# Patient Record
Sex: FEMALE | Race: WHITE | NOT HISPANIC OR LATINO | Employment: FULL TIME | URBAN - NONMETROPOLITAN AREA
[De-identification: names, ages, dates, MRNs, and addresses within clinical notes are randomized per-mention and may not be internally consistent; named-entity substitution may affect disease eponyms.]

---

## 2020-04-12 ENCOUNTER — NURSE TRIAGE (OUTPATIENT)
Dept: CALL CENTER | Facility: HOSPITAL | Age: 30
End: 2020-04-12

## 2020-04-12 NOTE — TELEPHONE ENCOUNTER
Feeling SOB but feels it is due to anxiety. Caller has ETOH abuse, she is attempting to stop drinking. Was started on Naltrexone about 2 weeks ago but started drinking on Thursday and has been binge drinking since Thursday. She usually drinks Vodka but switched to hard seltzer yesterday. She has been seeing a therapist but did not call her when she started drinking. She stopped taking the Naltrexone when she started drinking. Heart is beating so fast and she feels panicky. Has been pacing. Recently moved here from [facility]. She is not taking any antianxiety medications. She tried [facility] she googled the number but they were closed. The main thing she is worried about is her heart feels like it is too fast. She checked her pulse and it was 84 bpm, it feels like it is pounding. She says she knows she is panicky. She has an appt with her therapist on Tuesday. She is willing to speak with [facility]. I called the [facility] line and compoleted a warm transfer between the caller and [facility] at [facility].    Reason for Disposition  • Requesting to talk with a counselor (mental health worker, psychiatrist, etc.)    Additional Information  • Negative: Coma (e.g., not moving, not talking, not responding to stimuli)  • Negative: Difficult to awaken or acting confused (e.g., disoriented, slurred speech)  • Negative: Seeing, hearing, or feeling things that are not there (i.e., visual, auditory, or tactile hallucinations)  • Negative: Slow, shallow and weak breathing  • Negative: Seizure  • Negative: Violent behavior, or threatening to physically hurt or kill someone  • Negative: Patient attempted suicide  • Negative: Threatening suicide  • Negative: Sounds like a life-threatening emergency to the triager  • Negative: Recent significant injury, see that guideline (e.g., head, neck, chest, abdominal or extremity  injury)  • Negative: Substance abuse or dependence: question or problem related to  •  "Negative: Depression is main problem or symptom (e.g., feelings of sadness or hopelessness)  • Negative: SEVERE abdominal pain  • Negative: [1] Constant abdominal pain AND [2] present > 2 hours  • Negative: Blood in bowel movements (e.g., black, tarry or red blood)  (Exception: Blood on surface of BM with constipation)  • Negative: [1] Vomiting AND [2] contains red blood or black (\"coffee ground\") material  (Exception: few red streaks in vomit that only happened once)  • Negative: [1] Vomiting or dry heaves AND [2] occurring frequently (i.e., vomiting > 4 times in last 4 hours)  • Negative: Feeling very shaky (i.e., visible tremors of hands)  • Negative: Patient sounds very sick or weak to the triager  • Negative: White of the eyes have turned yellow (i.e., jaundice)  • Negative: Fever > 101.5 F (38.6 C)  • Negative: [1] Drinks alcohol daily AND [2] prior alcohol withdrawal seizures  • Negative: [1] Drinks alcohol daily AND [2] prior DTs (delirium tremens)  • Negative: Requesting admission for alcohol abuse    Answer Assessment - Initial Assessment Questions  1. DO YOU DRINK: \"Do you drink alcohol, including beer, wine or hard liquor?\"      *No Answer*  2. HOW OFTEN: \"How many days per week do you typically drink alcohol?\"      *No Answer*  3. HOW MUCH: \"How many drinks do you typically have on days when you drink?\" (1.5 oz hard liquor [one shot or jigger; 45 ml], 5 oz wine [small glass; 150 ml], 12 oz beer [one can; 360 ml])      *No Answer*  4. MOST: \"What is the most that you have had to drink on any one occasion in the last month?\"      *No Answer*  5. LAST 24 HOURS: \"Have you had a drink within the last 24 hours?\"      *No Answer*  6. DRINKING PROBLEM: \"Do you have or have you ever had a drinking problem?\"      *No Answer*  7. DRUG PROBLEM: \"Are you using any other drugs?\" (e.g., yes/no; cocaine, prescription medications, etc.)      Smoked pot in the last 30 days  8. SYMPTOMS: \"What symptoms are you currently " "experiencing?\" (e.g., none, tremors or shakiness, abdominal pain, vomiting, blackout spells)   panicky, feels shaky, no abdominal pain. Vomited yesterday, no vomiting today. No black out spells.  9. DETOX PROGRAM: \"Have you ever gone through a detox program?\"      *No Answer*  10. THERAPIST: \"Do you have a counselor or therapist? Name?\"        Has a therapist, Sherita Carlos  Just started going to Dr Ellis has seen Loyda Adhikari  11. SUPPORT: \"Who is with you now?\" \"Who do you live with?\" \"Do you have family or friends nearby who you can talk to?\" \"Are you a member of Alcoholics Anonymous?\"     no  12. PREGNANCY: \"Is there any chance you are pregnant?\" \"When was your last menstrual period?\"     no    Protocols used: ALCOHOL ABUSE AND DEPENDENCE-ADULT-AH      "

## 2020-06-04 ENCOUNTER — TELEMEDICINE (OUTPATIENT)
Dept: FAMILY MEDICINE CLINIC | Age: 30
End: 2020-06-04
Payer: COMMERCIAL

## 2020-06-04 VITALS — WEIGHT: 160 LBS | TEMPERATURE: 97.6 F | HEIGHT: 68 IN | BODY MASS INDEX: 24.25 KG/M2 | HEART RATE: 86 BPM

## 2020-06-04 PROBLEM — F32.A ANXIETY AND DEPRESSION: Status: ACTIVE | Noted: 2020-06-04

## 2020-06-04 PROBLEM — F10.21 ALCOHOL DEPENDENCE IN REMISSION (HCC): Status: ACTIVE | Noted: 2020-06-04

## 2020-06-04 PROBLEM — F41.9 ANXIETY AND DEPRESSION: Status: ACTIVE | Noted: 2020-06-04

## 2020-06-04 PROBLEM — F11.21 OPIOID DEPENDENCE IN REMISSION (HCC): Status: ACTIVE | Noted: 2020-06-04

## 2020-06-04 PROCEDURE — 99204 OFFICE O/P NEW MOD 45 MIN: CPT | Performed by: FAMILY MEDICINE

## 2020-06-04 RX ORDER — BUSPIRONE HYDROCHLORIDE 10 MG/1
TABLET ORAL
COMMUNITY
Start: 2020-05-19 | End: 2020-07-16

## 2020-06-04 RX ORDER — CITALOPRAM 10 MG/1
TABLET ORAL
COMMUNITY
Start: 2020-05-19 | End: 2020-06-04

## 2020-06-04 RX ORDER — ONDANSETRON HYDROCHLORIDE 8 MG/1
TABLET, FILM COATED ORAL
COMMUNITY
Start: 2020-04-13 | End: 2020-07-28 | Stop reason: SDUPTHER

## 2020-06-04 RX ORDER — SERTRALINE HYDROCHLORIDE 25 MG/1
TABLET, FILM COATED ORAL
COMMUNITY
Start: 2020-06-01 | End: 2020-06-04 | Stop reason: SINTOL

## 2020-06-04 RX ORDER — NALTREXONE HYDROCHLORIDE 50 MG/1
TABLET, FILM COATED ORAL
COMMUNITY
Start: 2020-05-17

## 2020-06-04 ASSESSMENT — PATIENT HEALTH QUESTIONNAIRE - PHQ9
2. FEELING DOWN, DEPRESSED OR HOPELESS: 1
1. LITTLE INTEREST OR PLEASURE IN DOING THINGS: 1
SUM OF ALL RESPONSES TO PHQ QUESTIONS 1-9: 2
SUM OF ALL RESPONSES TO PHQ QUESTIONS 1-9: 2
SUM OF ALL RESPONSES TO PHQ9 QUESTIONS 1 & 2: 2

## 2020-06-04 NOTE — PROGRESS NOTES
2020    TELEHEALTH EVALUATION -- Audio/Visual (During VTJFH-22 public health emergency)    HPI:    Froylan Martinez (:  1990) has requested an audio/video evaluation for the following concern(s):    Patient presents to establish care. She moved to the area approximately 1 year ago. She does see Dr. Dax Patel for management of anxiety and depression, alcohol dependence, is considering a new psychiatrist but is continuing to see her for now. She is otherwise pretty healthy. Her main concern today is intermittent chest pain, left-sided. This started approximately 1 week ago, seems to be positional.  In particular, when she sits up she has pain but when she is supine she does not. She denies any association with stress or exercise. She characterizes the pain as \"electric or buzzing. \"  Occasionally moves armpit, but no further. It lasts 30 seconds to 1 minute. She denies associated nausea, vomiting, diaphoresis, dyspnea. She denies any family history of premature coronary artery disease. However she does have extensive history of A. fib, both parents. She herself notes that \"I always have A. fib type symptoms\", but they seem to be fairly transient. Spoke to 1 of her family members who is also physician, and he suggested that she might have pericarditis. She was sick about 2 weeks ago, had a nonproductive cough at that time. Denies fever or pleurisy, I asked her to cough. PHQ Scores 2020   PHQ2 Score 2   PHQ9 Score 2     Interpretation of Total Score Depression Severity: 1-4 = Minimal depression, 5-9 = Mild depression, 10-14 = Moderate depression, 15-19 = Moderately severe depression, 20-27 = Severe depression    Review of Systems   Constitutional: Negative for chills and fever. HENT: Negative for facial swelling and mouth sores. Eyes: Negative for discharge and itching. Respiratory: Negative for apnea and stridor. Cardiovascular: Positive for chest pain.  Negative for Musculoskeletal:   [x] Normal gait with no signs of ataxia         [x] Normal range of motion of neck        [] Abnormal-       Neurological:        [x] No Facial Asymmetry (Cranial nerve 7 motor function) (limited exam to video visit)          [x] No gaze palsy        [] Abnormal-         Skin:        [x] No significant exanthematous lesions or discoloration noted on facial skin         [] Abnormal-            Psychiatric:       [x] Normal Affect [] No Hallucinations        [] Abnormal-     Other pertinent observable physical exam findings-     ASSESSMENT/PLAN:  Patient Active Problem List    Diagnosis Date Noted    [facility]2020     Overview Note:     Stable, continue Naltrexone      Anxiety and depression 06/04/2020     Overview Note:     Relatively stable, follow-up with psychiatry. She states she had side effects with the Zoloft and Celexa, and is therefore only taking BuSpar even though Dr. Eduin Jacobs has advised that she needs to be on an SSRI for optimal control of symptoms. 1. Anxiety and depression    2. Medication monitoring encounter  - CBC Auto Differential; Future  - Comprehensive Metabolic Panel; Future  - Magnesium; Future  - Lipid Panel; Future    3. Screening for HIV (human immunodeficiency virus)    4. Alcohol dependence in remission ([facility]    5. Intermittent left-sided chest pain  - XR CHEST STANDARD (2 VW); Future  - TSH WITH REFLEX TO FT4; Future  -As above. Advised patient that is difficult to make a diagnosis of pericarditis without a physical exam, though her history symptoms are somewhat suggestive. Proceed with work-up as above, patient voiced understanding    6.  Family history of thyroid disease  - TSH WITH REFLEX TO FT4; Future    Health Maintenance   Topic Date Due    DTaP/Tdap/Td vaccine (1 - Tdap) 11/15/2010    Cervical cancer screen  11/15/2012    Flu vaccine (Season Ended) 09/01/2020    HIV screen  Completed    Hepatitis A vaccine

## 2020-06-05 ASSESSMENT — ENCOUNTER SYMPTOMS
FACIAL SWELLING: 0
APNEA: 0
NAUSEA: 0
VOMITING: 0
STRIDOR: 0
EYE DISCHARGE: 0
COLOR CHANGE: 0
EYE ITCHING: 0
BACK PAIN: 0

## 2020-07-16 ENCOUNTER — TELEMEDICINE (OUTPATIENT)
Dept: FAMILY MEDICINE CLINIC | Age: 30
End: 2020-07-16
Payer: COMMERCIAL

## 2020-07-16 PROCEDURE — G8431 POS CLIN DEPRES SCRN F/U DOC: HCPCS | Performed by: FAMILY MEDICINE

## 2020-07-16 PROCEDURE — 99214 OFFICE O/P EST MOD 30 MIN: CPT | Performed by: FAMILY MEDICINE

## 2020-07-16 PROCEDURE — G0444 DEPRESSION SCREEN ANNUAL: HCPCS | Performed by: FAMILY MEDICINE

## 2020-07-16 RX ORDER — DULOXETIN HYDROCHLORIDE 30 MG/1
30 CAPSULE, DELAYED RELEASE ORAL DAILY
Qty: 30 CAPSULE | Refills: 1 | Status: SHIPPED | OUTPATIENT
Start: 2020-07-16

## 2020-07-16 RX ORDER — HYDROXYZINE HYDROCHLORIDE 25 MG/1
25 TABLET, FILM COATED ORAL EVERY 8 HOURS PRN
Qty: 90 TABLET | Refills: 0 | Status: SHIPPED | OUTPATIENT
Start: 2020-07-16

## 2020-07-16 ASSESSMENT — PATIENT HEALTH QUESTIONNAIRE - PHQ9
5. POOR APPETITE OR OVEREATING: 3
3. TROUBLE FALLING OR STAYING ASLEEP: 3
1. LITTLE INTEREST OR PLEASURE IN DOING THINGS: 2
7. TROUBLE CONCENTRATING ON THINGS, SUCH AS READING THE NEWSPAPER OR WATCHING TELEVISION: 0
SUM OF ALL RESPONSES TO PHQ QUESTIONS 1-9: 14
10. IF YOU CHECKED OFF ANY PROBLEMS, HOW DIFFICULT HAVE THESE PROBLEMS MADE IT FOR YOU TO DO YOUR WORK, TAKE CARE OF THINGS AT HOME, OR GET ALONG WITH OTHER PEOPLE: 1
8. MOVING OR SPEAKING SO SLOWLY THAT OTHER PEOPLE COULD HAVE NOTICED. OR THE OPPOSITE, BEING SO FIGETY OR RESTLESS THAT YOU HAVE BEEN MOVING AROUND A LOT MORE THAN USUAL: 1
SUM OF ALL RESPONSES TO PHQ9 QUESTIONS 1 & 2: 3
6. FEELING BAD ABOUT YOURSELF - OR THAT YOU ARE A FAILURE OR HAVE LET YOURSELF OR YOUR FAMILY DOWN: 2
SUM OF ALL RESPONSES TO PHQ QUESTIONS 1-9: 14
4. FEELING TIRED OR HAVING LITTLE ENERGY: 2
9. THOUGHTS THAT YOU WOULD BE BETTER OFF DEAD, OR OF HURTING YOURSELF: 0
2. FEELING DOWN, DEPRESSED OR HOPELESS: 1

## 2020-07-16 ASSESSMENT — ANXIETY QUESTIONNAIRES
3. WORRYING TOO MUCH ABOUT DIFFERENT THINGS: 3-NEARLY EVERY DAY
GAD7 TOTAL SCORE: 18
6. BECOMING EASILY ANNOYED OR IRRITABLE: 2-OVER HALF THE DAYS
2. NOT BEING ABLE TO STOP OR CONTROL WORRYING: 3-NEARLY EVERY DAY
4. TROUBLE RELAXING: 3-NEARLY EVERY DAY
7. FEELING AFRAID AS IF SOMETHING AWFUL MIGHT HAPPEN: 2-OVER HALF THE DAYS
5. BEING SO RESTLESS THAT IT IS HARD TO SIT STILL: 2-OVER HALF THE DAYS
1. FEELING NERVOUS, ANXIOUS, OR ON EDGE: 3-NEARLY EVERY DAY

## 2020-07-16 ASSESSMENT — ENCOUNTER SYMPTOMS
VOMITING: 0
COLOR CHANGE: 0
FACIAL SWELLING: 0
STRIDOR: 0
EYE DISCHARGE: 0
BACK PAIN: 0
EYE ITCHING: 0
NAUSEA: 0
APNEA: 0

## 2020-07-16 NOTE — PROGRESS NOTES
2020    TELEHEALTH EVALUATION -- Audio/Visual (During TTPFM-67 public health emergency)    HPI:    Jane Cobos (:  1990) has requested an audio/video evaluation for the following concern(s):    Patient presents for follow-up of anxiety and depression. She states she no longer is seen Dr. Gualberto Aguiar, she is looking for a new psychiatrist covered by her insurance. In the interim, she does feel like her medication need to be adjusted. She weaned herself off of BuSpar and Zoloft, stopped taking BuSpar altogether about 2 weeks ago and Zoloft approximately 3 months ago. Her anxiety and depression scores are as noted below. YONIS 7 SCORE 2020   YONIS-7 Total Score 18     Interpretation of YONIS-7 score: 5-9 = mild anxiety, 10-14 = moderate anxiety, 15+ = severe anxiety. Recommend referral to behavioral health for scores 10 or greater. PHQ Scores 2020   PHQ2 Score 3 2   PHQ9 Score 14 2     Interpretation of Total Score Depression Severity: 1-4 = Minimal depression, 5-9 = Mild depression, 10-14 = Moderate depression, 15-19 = Moderately severe depression, 20-27 = Severe depression    Review of Systems   Constitutional: Negative for chills and fever. HENT: Negative for facial swelling and mouth sores. Eyes: Negative for discharge and itching. Respiratory: Negative for apnea and stridor. Cardiovascular: Negative for chest pain and palpitations. Gastrointestinal: Negative for nausea and vomiting. Endocrine: Negative for cold intolerance and heat intolerance. Genitourinary: Negative for frequency and urgency. Musculoskeletal: Negative for arthralgias and back pain. Skin: Negative for color change and rash. Psychiatric/Behavioral: Positive for dysphoric mood. The patient is nervous/anxious. Prior to Visit Medications    Medication Sig Taking?  Authorizing Provider   DULoxetine (CYMBALTA) 30 MG extended release capsule Take 1 capsule by mouth daily Yes Alejandra Gunter Christian Shin MD   hydrOXYzine (ATARAX) 25 MG tablet Take 1 tablet by mouth every 8 hours as needed for Anxiety Yes Willard Soto MD   naltrexone (DEPADE) 50 MG tablet TK 1 T PO QD  Historical Provider, MD   ondansetron (ZOFRAN) 8 MG tablet TK 1 T PO TID  Historical Provider, MD       Social History     Tobacco Use    Smoking status: Never Smoker    Smokeless tobacco: Never Used   Substance Use Topics    Alcohol use: Not Currently     Comment: now on naltrexone, rarely drinks now    Drug use: Yes     Types: Marijuana            PHYSICAL EXAMINATION:  [ INSTRUCTIONS:  \"[x]\" Indicates a positive item  \"[]\" Indicates a negative item  -- DELETE ALL ITEMS NOT EXAMINED]  Vital Signs: (As obtained by patient/caregiver or practitioner observation)    Blood pressure-  Heart rate-    Respiratory rate-    Temperature-  Pulse oximetry-     Patient does not have the equipment to obtain 3 vital signs. Constitutional: [x] Appears well-developed and well-nourished [x] No apparent distress      [] Abnormal-   Mental status  [x] Alert and awake  [x] Oriented to person/place/time [x]Able to follow commands      Eyes:  EOM    [x]  Normal  [] Abnormal-  Sclera  [x]  Normal  [] Abnormal -         Discharge [x]  None visible  [] Abnormal -    HENT:   [x] Normocephalic, atraumatic.   [] Abnormal   [x] Mouth/Throat: Mucous membranes are moist.     External Ears [x] Normal  [] Abnormal-     Neck: [x] No visualized mass     Pulmonary/Chest: [x] Respiratory effort normal.  [x] No visualized signs of difficulty breathing or respiratory distress        [] Abnormal-      Musculoskeletal:   [x] Normal gait with no signs of ataxia         [x] Normal range of motion of neck        [] Abnormal-       Neurological:        [x] No Facial Asymmetry (Cranial nerve 7 motor function) (limited exam to video visit)          [x] No gaze palsy        [] Abnormal-         Skin:        [x] No significant exanthematous lesions or discoloration noted on facial skin         [] Abnormal-            Psychiatric:       [x] Normal Affect [x] No Hallucinations        [] Abnormal-     Other pertinent observable physical exam findings-     ASSESSMENT/PLAN:  Patient Active Problem List    Diagnosis Date Noted    [facility] 06/04/2020     Overview Note:     Stable, continue Naltrexone      Anxiety and depression 06/04/2020     Overview Note:     Relatively stable, follow-up with psychiatry. She states she had side effects with the Zoloft and Celexa, and is therefore only taking BuSpar even though Dr. Sabina Bustillo has advised that she needs to be on an SSRI for optimal control of symptoms. Maria Isabel Wang was seen today for follow-up. Diagnoses and all orders for this visit:    Anxiety and depression  -     DULoxetine (CYMBALTA) 30 MG extended release capsule; Take 1 capsule by mouth daily  -     hydrOXYzine (ATARAX) 25 MG tablet; Take 1 tablet by mouth every 8 hours as needed for Anxiety    Positive depression screening  -     Positive Screen for Clinical Depression with a Documented Follow-up Plan       1. Location of patient - Home  2. Location of physician - Office  3. Other individuals on this call - no  4. Time documentation - Over 50% of the total visit time of 25 minutes was spent on counseling and/or coordination of care of   1. Anxiety and depression    2. Positive depression screening          Return for already scheduled follow-up. Kathy Jimenez is a 30 y.o. female being evaluated by a Virtual Visit (video visit) encounter to address concerns as mentioned above. A caregiver was present when appropriate. Due to this being a TeleHealth encounter (During AIJKN-95 public health emergency), evaluation of the following organ systems was limited: Vitals/Constitutional/EENT/Resp/CV/GI//MS/Neuro/Skin/Heme-Lymph-Imm.   Pursuant to the emergency declaration under the [facility] waiver authority and the

## 2020-07-21 ENCOUNTER — TELEPHONE (OUTPATIENT)
Dept: FAMILY MEDICINE CLINIC | Age: 30
End: 2020-07-21

## 2020-07-21 NOTE — TELEPHONE ENCOUNTER
Pt called and wanted to let you know the duloxetine she started was making her feel awful and she is not going to be able to take it. She says she had mentioned the other medications that she tried and they always make her sick. She also says the hydroxyzine has helped but makes her very tired. Please advise?

## 2020-07-27 PROBLEM — F10.239 ALCOHOL DEPENDENCE WITH WITHDRAWAL (HCC): Status: ACTIVE | Noted: 2020-06-04

## 2020-08-31 ENCOUNTER — OFFICE VISIT (OUTPATIENT)
Age: 30
End: 2020-08-31

## 2020-08-31 VITALS — TEMPERATURE: 96.8 F | HEART RATE: 100 BPM | OXYGEN SATURATION: 95 %

## 2020-08-31 NOTE — PATIENT INSTRUCTIONS
Preventing the Spread of Coronavirus Disease 2019 in Homes and Residential Communities   For the most recent information go to [email or web address]    Prevention steps for People with confirmed or suspected COVID-19 (including persons under investigation) who do not need to be hospitalized  and   People with confirmed COVID-19 who were hospitalized and determined to be medically stable to go home    Your healthcare provider and public health staff will evaluate whether you can be cared for at home. If it is determined that you do not need to be hospitalized and can be isolated at home, you will be monitored by staff from your local or state health department. You should follow the prevention steps below until a healthcare provider or local or state health department says you can return to your normal activities. Stay home except to get medical care  People who are mildly ill with COVID-19 are able to isolate at home during their illness. You should restrict activities outside your home, except for getting medical care. Do not go to work, school, or public areas. Avoid using public transportation, ride-sharing, or taxis. Separate yourself from other people and animals in your home  People: As much as possible, you should stay in a specific room and away from other people in your home. Also, you should use a separate bathroom, if available. Animals: You should restrict contact with pets and other animals while you are sick with COVID-19, just like you would around other people. Although there have not been reports of pets or other animals becoming sick with COVID-19, it is still recommended that people sick with COVID-19 limit contact with animals until more information is known about the virus. When possible, have another member of your household care for your animals while you are sick.  If you are sick with COVID-19, avoid contact with your pet, including petting, snuggling, being kissed or licked, and sharing food. If you must care for your pet or be around animals while you are sick, wash your hands before and after you interact with pets and wear a facemask. Call ahead before visiting your doctor  If you have a medical appointment, call the healthcare provider and tell them that you have or may have COVID-19. This will help the healthcare providers office take steps to keep other people from getting infected or exposed. Wear a facemask  You should wear a facemask when you are around other people (e.g., sharing a room or vehicle) or pets and before you enter a healthcare providers office. If you are not able to wear a facemask (for example, because it causes trouble breathing), then people who live with you should not stay in the same room with you, or they should wear a facemask if they enter your room. Cover your coughs and sneezes  Cover your mouth and nose with a tissue when you cough or sneeze. Throw used tissues in a lined trash can. Immediately wash your hands with soap and water for at least 20 seconds or, if soap and water are not available, clean your hands with an alcohol-based hand  that contains at least 60% alcohol. Clean your hands often  Wash your hands often with soap and water for at least 20 seconds, especially after blowing your nose, coughing, or sneezing; going to the bathroom; and before eating or preparing food. If soap and water are not readily available, use an alcohol-based hand  with at least 60% alcohol, covering all surfaces of your hands and rubbing them together until they feel dry. Soap and water are the best option if hands are visibly dirty. Avoid touching your eyes, nose, and mouth with unwashed hands. Avoid sharing personal household items  You should not share dishes, drinking glasses, cups, eating utensils, towels, or bedding with other people or pets in your home.  After using these items, they should departments. [employer] allows you to send messages to your doctor, view your test results, renew your prescriptions, schedule appointments, view visit notes, and more. How Do I Sign Up? 1. In your Internet browser, go to [email or web address]  2. Click on the Sign Up Now link in the Sign In box. You will see the New Member Sign Up page. 3. Enter your [employer] Access Code exactly as it appears below. You will not need to use this code after youve completed the sign-up process. If you do not sign up before the expiration date, you must request a new code. [employer] Access Code: Activation code not generated  Current [facility] Status: Active    4. Enter your Social Security Number ([id number]) and Date of Birth (mm/dd/yyyy) as indicated and click Submit. You will be taken to the next sign-up page. 5. Create a [facility] ID. This will be your [employer] login ID and cannot be changed, so think of one that is secure and easy to remember. 6. Create a [employer] password. You can change your password at any time. 7. Enter your Password Reset Question and Answer. This can be used at a later time if you forget your password. 8. Enter your e-mail address. You will receive e-mail notification when new information is available in [address] 9. Click Sign Up. You can now view your medical record. Additional Information  If you have questions, please contact the physician practice where you receive care. Remember, [facility] is NOT to be used for urgent needs. For medical emergencies, dial 911. For questions regarding your [employer] account call [phone]. If you have a clinical question, please call your doctor's office.

## 2020-08-31 NOTE — PROGRESS NOTES
2020    Cinda Phillips (:  1990) is a 30 y.o. female, here requesting COVID-19 testing    History of Present Illness  Pt is here for COVID swab, not evaluated in this clinic    Vitals:    20 1728   Pulse: 100   Temp: 96.8 °F (36 °C)   SpO2: 95%       ASSESSMENT  Screening for COVID-19/ Viral disease    PLAN    COVID-19 sample collected and submitted  Patient given detailed CDC instructions contained within After Visit Summary       An  electronic signature was used to authenticate this note.     --GERA Wolfe - CNP on 2020 at 5:30 PM

## 2020-09-02 LAB — SARS-COV-2, NAA: NOT DETECTED

## 2020-09-10 ENCOUNTER — TELEPHONE (OUTPATIENT)
Dept: ADMINISTRATIVE | Age: 30
End: 2020-09-10

## 2021-05-05 ENCOUNTER — TELEPHONE (OUTPATIENT)
Dept: FAMILY MEDICINE CLINIC | Age: 31
End: 2021-05-05

## 2021-05-05 NOTE — TELEPHONE ENCOUNTER
Patient called and left a voicemail that she had some questions about a possible stomach bug. I tried to return the call and got her voicemail, asked that she call back and we can set her up with a NP for a virtual visit or in office.

## 2021-08-31 ENCOUNTER — APPOINTMENT (OUTPATIENT)
Dept: CT IMAGING | Facility: HOSPITAL | Age: 31
End: 2021-08-31

## 2021-08-31 ENCOUNTER — HOSPITAL ENCOUNTER (INPATIENT)
Facility: HOSPITAL | Age: 31
LOS: 4 days | Discharge: HOME | End: 2021-09-04
Attending: INTERNAL MEDICINE | Admitting: FAMILY MEDICINE

## 2021-08-31 DIAGNOSIS — U07.1 COVID-19: ICD-10-CM

## 2021-08-31 DIAGNOSIS — Z86.19 HISTORY OF SALMONELLA INFECTION: ICD-10-CM

## 2021-08-31 DIAGNOSIS — R79.89 ELEVATED LACTIC ACID LEVEL: ICD-10-CM

## 2021-08-31 DIAGNOSIS — R31.9 URINARY TRACT INFECTION WITH HEMATURIA, SITE UNSPECIFIED: ICD-10-CM

## 2021-08-31 DIAGNOSIS — K85.20 ALCOHOL-INDUCED ACUTE PANCREATITIS WITHOUT INFECTION OR NECROSIS: Primary | ICD-10-CM

## 2021-08-31 DIAGNOSIS — N39.0 URINARY TRACT INFECTION WITH HEMATURIA, SITE UNSPECIFIED: ICD-10-CM

## 2021-08-31 DIAGNOSIS — R74.01 TRANSAMINITIS: ICD-10-CM

## 2021-08-31 LAB
ALBUMIN SERPL-MCNC: 4.4 G/DL (ref 3.5–5.2)
ALBUMIN/GLOB SERPL: 1.4 G/DL
ALP SERPL-CCNC: 130 U/L (ref 39–117)
ALT SERPL W P-5'-P-CCNC: 114 U/L (ref 1–33)
AMPHET+METHAMPHET UR QL: NEGATIVE
AMPHETAMINES UR QL: NEGATIVE
ANION GAP SERPL CALCULATED.3IONS-SCNC: 33 MMOL/L (ref 5–15)
APTT PPP: 24.9 SECONDS (ref 24.1–35)
AST SERPL-CCNC: 253 U/L (ref 1–32)
B-HCG UR QL: NEGATIVE
BACTERIA UR QL AUTO: ABNORMAL /HPF
BARBITURATES UR QL SCN: NEGATIVE
BASOPHILS # BLD AUTO: 0.05 10*3/MM3 (ref 0–0.2)
BASOPHILS NFR BLD AUTO: 0.3 % (ref 0–1.5)
BENZODIAZ UR QL SCN: POSITIVE
BILIRUB SERPL-MCNC: 1.7 MG/DL (ref 0–1.2)
BILIRUB UR QL STRIP: ABNORMAL
BUN SERPL-MCNC: 8 MG/DL (ref 6–20)
BUN/CREAT SERPL: 16 (ref 7–25)
BUPRENORPHINE SERPL-MCNC: NEGATIVE NG/ML
CALCIUM SPEC-SCNC: 9.4 MG/DL (ref 8.6–10.5)
CANNABINOIDS SERPL QL: POSITIVE
CHLORIDE SERPL-SCNC: 90 MMOL/L (ref 98–107)
CLARITY UR: ABNORMAL
CO2 SERPL-SCNC: 16 MMOL/L (ref 22–29)
COCAINE UR QL: NEGATIVE
COLOR UR: ABNORMAL
CREAT SERPL-MCNC: 0.5 MG/DL (ref 0.57–1)
D-LACTATE SERPL-SCNC: 5.5 MMOL/L (ref 0.5–2)
D-LACTATE SERPL-SCNC: 7.7 MMOL/L (ref 0.5–2)
DEPRECATED RDW RBC AUTO: 53.3 FL (ref 37–54)
EOSINOPHIL # BLD AUTO: 0 10*3/MM3 (ref 0–0.4)
EOSINOPHIL NFR BLD AUTO: 0 % (ref 0.3–6.2)
ERYTHROCYTE [DISTWIDTH] IN BLOOD BY AUTOMATED COUNT: 14.6 % (ref 12.3–15.4)
ETHANOL UR QL: 0.1 %
FERRITIN SERPL-MCNC: 1048 NG/ML (ref 13–150)
GFR SERPL CREATININE-BSD FRML MDRD: 146 ML/MIN/1.73
GFR SERPL CREATININE-BSD FRML MDRD: >150 ML/MIN/1.73
GLOBULIN UR ELPH-MCNC: 3.1 GM/DL
GLUCOSE SERPL-MCNC: 107 MG/DL (ref 65–99)
GLUCOSE UR STRIP-MCNC: NEGATIVE MG/DL
HAV IGM SERPL QL IA: NORMAL
HBV CORE IGM SERPL QL IA: NORMAL
HBV SURFACE AG SERPL QL IA: NORMAL
HCT VFR BLD AUTO: 53 % (ref 34–46.6)
HCV AB SER DONR QL: NORMAL
HGB BLD-MCNC: 18.9 G/DL (ref 12–15.9)
HGB UR QL STRIP.AUTO: ABNORMAL
HOLD SPECIMEN: NORMAL
HOLD SPECIMEN: NORMAL
HYALINE CASTS UR QL AUTO: ABNORMAL /LPF
IMM GRANULOCYTES # BLD AUTO: 0.06 10*3/MM3 (ref 0–0.05)
IMM GRANULOCYTES NFR BLD AUTO: 0.4 % (ref 0–0.5)
INR PPP: 1.16 (ref 0.91–1.09)
INTERNAL NEGATIVE CONTROL: NEGATIVE
INTERNAL POSITIVE CONTROL: POSITIVE
KETONES UR QL STRIP: ABNORMAL
LDH SERPL-CCNC: 376 U/L (ref 135–214)
LEUKOCYTE ESTERASE UR QL STRIP.AUTO: ABNORMAL
LIPASE SERPL-CCNC: 1290 U/L (ref 13–60)
LYMPHOCYTES # BLD AUTO: 1.13 10*3/MM3 (ref 0.7–3.1)
LYMPHOCYTES NFR BLD AUTO: 7 % (ref 19.6–45.3)
Lab: NORMAL
MCH RBC QN AUTO: 36.7 PG (ref 26.6–33)
MCHC RBC AUTO-ENTMCNC: 35.7 G/DL (ref 31.5–35.7)
MCV RBC AUTO: 102.9 FL (ref 79–97)
METHADONE UR QL SCN: NEGATIVE
MONOCYTES # BLD AUTO: 1.09 10*3/MM3 (ref 0.1–0.9)
MONOCYTES NFR BLD AUTO: 6.8 % (ref 5–12)
NEUTROPHILS NFR BLD AUTO: 13.81 10*3/MM3 (ref 1.7–7)
NEUTROPHILS NFR BLD AUTO: 85.5 % (ref 42.7–76)
NITRITE UR QL STRIP: POSITIVE
NRBC BLD AUTO-RTO: 0 /100 WBC (ref 0–0.2)
OPIATES UR QL: POSITIVE
OXYCODONE UR QL SCN: NEGATIVE
PCP UR QL SCN: NEGATIVE
PH UR STRIP.AUTO: <=5 [PH] (ref 5–8)
PLATELET # BLD AUTO: 224 10*3/MM3 (ref 140–450)
PMV BLD AUTO: 11.3 FL (ref 6–12)
POTASSIUM SERPL-SCNC: 3.4 MMOL/L (ref 3.5–5.2)
PROCALCITONIN SERPL-MCNC: 0.03 NG/ML (ref 0–0.25)
PROPOXYPH UR QL: NEGATIVE
PROT SERPL-MCNC: 7.5 G/DL (ref 6–8.5)
PROT UR QL STRIP: ABNORMAL
PROTHROMBIN TIME: 13.9 SECONDS (ref 11.5–13.4)
RBC # BLD AUTO: 5.15 10*6/MM3 (ref 3.77–5.28)
RBC # UR: ABNORMAL /HPF
REF LAB TEST METHOD: ABNORMAL
SARS-COV-2 RNA PNL SPEC NAA+PROBE: DETECTED
SODIUM SERPL-SCNC: 139 MMOL/L (ref 136–145)
SP GR UR STRIP: >1.03 (ref 1–1.03)
SQUAMOUS #/AREA URNS HPF: ABNORMAL /HPF
TRICYCLICS UR QL SCN: NEGATIVE
UROBILINOGEN UR QL STRIP: ABNORMAL
WBC # BLD AUTO: 16.14 10*3/MM3 (ref 3.4–10.8)
WBC UR QL AUTO: ABNORMAL /HPF
WHOLE BLOOD HOLD SPECIMEN: NORMAL
WHOLE BLOOD HOLD SPECIMEN: NORMAL

## 2021-08-31 PROCEDURE — 25010000002 LORAZEPAM PER 2 MG: Performed by: INTERNAL MEDICINE

## 2021-08-31 PROCEDURE — 80053 COMPREHEN METABOLIC PANEL: CPT | Performed by: EMERGENCY MEDICINE

## 2021-08-31 PROCEDURE — 85730 THROMBOPLASTIN TIME PARTIAL: CPT | Performed by: NURSE PRACTITIONER

## 2021-08-31 PROCEDURE — 93005 ELECTROCARDIOGRAM TRACING: CPT | Performed by: INTERNAL MEDICINE

## 2021-08-31 PROCEDURE — 25010000002 ONDANSETRON PER 1 MG: Performed by: NURSE PRACTITIONER

## 2021-08-31 PROCEDURE — 85025 COMPLETE CBC W/AUTO DIFF WBC: CPT | Performed by: EMERGENCY MEDICINE

## 2021-08-31 PROCEDURE — 25010000002 LEVOFLOXACIN PER 250 MG: Performed by: INTERNAL MEDICINE

## 2021-08-31 PROCEDURE — 83690 ASSAY OF LIPASE: CPT | Performed by: EMERGENCY MEDICINE

## 2021-08-31 PROCEDURE — 87040 BLOOD CULTURE FOR BACTERIA: CPT | Performed by: NURSE PRACTITIONER

## 2021-08-31 PROCEDURE — 81001 URINALYSIS AUTO W/SCOPE: CPT | Performed by: EMERGENCY MEDICINE

## 2021-08-31 PROCEDURE — 99285 EMERGENCY DEPT VISIT HI MDM: CPT

## 2021-08-31 PROCEDURE — 25010000002 CEFTRIAXONE PER 250 MG: Performed by: NURSE PRACTITIONER

## 2021-08-31 PROCEDURE — 25010000002 PROCHLORPERAZINE 10 MG/2ML SOLUTION: Performed by: INTERNAL MEDICINE

## 2021-08-31 PROCEDURE — 80074 ACUTE HEPATITIS PANEL: CPT | Performed by: NURSE PRACTITIONER

## 2021-08-31 PROCEDURE — 74177 CT ABD & PELVIS W/CONTRAST: CPT

## 2021-08-31 PROCEDURE — 93010 ELECTROCARDIOGRAM REPORT: CPT | Performed by: INTERNAL MEDICINE

## 2021-08-31 PROCEDURE — 87635 SARS-COV-2 COVID-19 AMP PRB: CPT | Performed by: NURSE PRACTITIONER

## 2021-08-31 PROCEDURE — 85610 PROTHROMBIN TIME: CPT | Performed by: NURSE PRACTITIONER

## 2021-08-31 PROCEDURE — 82728 ASSAY OF FERRITIN: CPT | Performed by: NURSE PRACTITIONER

## 2021-08-31 PROCEDURE — 25010000002 HYDROMORPHONE PER 4 MG: Performed by: NURSE PRACTITIONER

## 2021-08-31 PROCEDURE — 81025 URINE PREGNANCY TEST: CPT | Performed by: EMERGENCY MEDICINE

## 2021-08-31 PROCEDURE — 93005 ELECTROCARDIOGRAM TRACING: CPT | Performed by: EMERGENCY MEDICINE

## 2021-08-31 PROCEDURE — 83615 LACTATE (LD) (LDH) ENZYME: CPT | Performed by: NURSE PRACTITIONER

## 2021-08-31 PROCEDURE — 83605 ASSAY OF LACTIC ACID: CPT | Performed by: NURSE PRACTITIONER

## 2021-08-31 PROCEDURE — 82077 ASSAY SPEC XCP UR&BREATH IA: CPT | Performed by: NURSE PRACTITIONER

## 2021-08-31 PROCEDURE — 25010000002 METOCLOPRAMIDE PER 10 MG: Performed by: NURSE PRACTITIONER

## 2021-08-31 PROCEDURE — 80306 DRUG TEST PRSMV INSTRMNT: CPT | Performed by: NURSE PRACTITIONER

## 2021-08-31 PROCEDURE — 25010000002 IOPAMIDOL 61 % SOLUTION: Performed by: NURSE PRACTITIONER

## 2021-08-31 PROCEDURE — 84145 PROCALCITONIN (PCT): CPT | Performed by: NURSE PRACTITIONER

## 2021-08-31 PROCEDURE — 81025 URINE PREGNANCY TEST: CPT | Performed by: INTERNAL MEDICINE

## 2021-08-31 RX ORDER — SODIUM CHLORIDE 0.9 % (FLUSH) 0.9 %
10 SYRINGE (ML) INJECTION AS NEEDED
Status: DISCONTINUED | OUTPATIENT
Start: 2021-08-31 | End: 2021-09-04 | Stop reason: HOSPADM

## 2021-08-31 RX ORDER — METOCLOPRAMIDE HYDROCHLORIDE 5 MG/ML
10 INJECTION INTRAMUSCULAR; INTRAVENOUS ONCE
Status: COMPLETED | OUTPATIENT
Start: 2021-08-31 | End: 2021-08-31

## 2021-08-31 RX ORDER — ONDANSETRON 2 MG/ML
4 INJECTION INTRAMUSCULAR; INTRAVENOUS ONCE
Status: COMPLETED | OUTPATIENT
Start: 2021-08-31 | End: 2021-08-31

## 2021-08-31 RX ORDER — LORAZEPAM 2 MG/ML
1 INJECTION INTRAMUSCULAR EVERY 8 HOURS
Status: DISCONTINUED | OUTPATIENT
Start: 2021-09-01 | End: 2021-09-01

## 2021-08-31 RX ORDER — FAMOTIDINE 10 MG/ML
20 INJECTION, SOLUTION INTRAVENOUS EVERY 12 HOURS SCHEDULED
Status: DISCONTINUED | OUTPATIENT
Start: 2021-08-31 | End: 2021-09-04 | Stop reason: HOSPADM

## 2021-08-31 RX ORDER — LEVOFLOXACIN 5 MG/ML
500 INJECTION, SOLUTION INTRAVENOUS EVERY 24 HOURS
Status: DISCONTINUED | OUTPATIENT
Start: 2021-08-31 | End: 2021-09-01

## 2021-08-31 RX ORDER — NALOXONE HCL 0.4 MG/ML
0.1 VIAL (ML) INJECTION
Status: DISCONTINUED | OUTPATIENT
Start: 2021-08-31 | End: 2021-09-03

## 2021-08-31 RX ORDER — PROCHLORPERAZINE EDISYLATE 5 MG/ML
2.5 INJECTION INTRAMUSCULAR; INTRAVENOUS EVERY 6 HOURS PRN
Status: DISCONTINUED | OUTPATIENT
Start: 2021-08-31 | End: 2021-09-04 | Stop reason: HOSPADM

## 2021-08-31 RX ORDER — HYDROMORPHONE HYDROCHLORIDE 1 MG/ML
0.5 INJECTION, SOLUTION INTRAMUSCULAR; INTRAVENOUS; SUBCUTANEOUS ONCE
Status: COMPLETED | OUTPATIENT
Start: 2021-08-31 | End: 2021-08-31

## 2021-08-31 RX ORDER — LORAZEPAM 2 MG/ML
1 INJECTION INTRAMUSCULAR EVERY 6 HOURS
Status: DISCONTINUED | OUTPATIENT
Start: 2021-08-31 | End: 2021-09-01

## 2021-08-31 RX ADMIN — LORAZEPAM 1 MG: 2 INJECTION INTRAMUSCULAR; INTRAVENOUS at 21:16

## 2021-08-31 RX ADMIN — SODIUM CHLORIDE 1013 ML: 9 INJECTION, SOLUTION INTRAVENOUS at 17:55

## 2021-08-31 RX ADMIN — METOCLOPRAMIDE HYDROCHLORIDE 10 MG: 5 INJECTION INTRAMUSCULAR; INTRAVENOUS at 17:11

## 2021-08-31 RX ADMIN — SODIUM CHLORIDE 1000 ML: 9 INJECTION, SOLUTION INTRAVENOUS at 17:08

## 2021-08-31 RX ADMIN — PROCHLORPERAZINE EDISYLATE 2.5 MG: 5 INJECTION, SOLUTION INTRAMUSCULAR; INTRAVENOUS at 23:21

## 2021-08-31 RX ADMIN — FAMOTIDINE 20 MG: 10 INJECTION INTRAVENOUS at 21:16

## 2021-08-31 RX ADMIN — ONDANSETRON 4 MG: 2 INJECTION INTRAMUSCULAR; INTRAVENOUS at 20:00

## 2021-08-31 RX ADMIN — LORAZEPAM 1 MG: 2 INJECTION INTRAMUSCULAR; INTRAVENOUS at 23:21

## 2021-08-31 RX ADMIN — HYDROMORPHONE HYDROCHLORIDE 0.5 MG: 1 INJECTION, SOLUTION INTRAMUSCULAR; INTRAVENOUS; SUBCUTANEOUS at 17:11

## 2021-08-31 RX ADMIN — SODIUM CHLORIDE 1 G: 9 INJECTION, SOLUTION INTRAVENOUS at 20:03

## 2021-08-31 RX ADMIN — IOPAMIDOL 100 ML: 612 INJECTION, SOLUTION INTRAVENOUS at 17:24

## 2021-08-31 RX ADMIN — LEVOFLOXACIN 500 MG: 500 INJECTION, SOLUTION INTRAVENOUS at 21:16

## 2021-09-01 ENCOUNTER — APPOINTMENT (OUTPATIENT)
Dept: MRI IMAGING | Facility: HOSPITAL | Age: 31
End: 2021-09-01

## 2021-09-01 PROBLEM — U07.1 COVID-19 VIRUS DETECTED: Status: ACTIVE | Noted: 2021-09-01

## 2021-09-01 PROBLEM — R74.8 ABNORMAL TRANSAMINASES: Status: ACTIVE | Noted: 2021-09-01

## 2021-09-01 PROBLEM — E80.6 HYPERBILIRUBINEMIA: Status: ACTIVE | Noted: 2021-09-01

## 2021-09-01 LAB
ALBUMIN SERPL-MCNC: 3.5 G/DL (ref 3.5–5.2)
ALBUMIN/GLOB SERPL: 1.5 G/DL
ALP SERPL-CCNC: 94 U/L (ref 39–117)
ALT SERPL W P-5'-P-CCNC: 77 U/L (ref 1–33)
ANION GAP SERPL CALCULATED.3IONS-SCNC: 21 MMOL/L (ref 5–15)
AST SERPL-CCNC: 112 U/L (ref 1–32)
BASOPHILS # BLD AUTO: 0.02 10*3/MM3 (ref 0–0.2)
BASOPHILS NFR BLD AUTO: 0.2 % (ref 0–1.5)
BILIRUB SERPL-MCNC: 1.8 MG/DL (ref 0–1.2)
BUN SERPL-MCNC: 6 MG/DL (ref 6–20)
BUN/CREAT SERPL: 12.8 (ref 7–25)
CALCIUM SPEC-SCNC: 8.4 MG/DL (ref 8.6–10.5)
CHLORIDE SERPL-SCNC: 97 MMOL/L (ref 98–107)
CO2 SERPL-SCNC: 24 MMOL/L (ref 22–29)
CREAT SERPL-MCNC: 0.47 MG/DL (ref 0.57–1)
D-LACTATE SERPL-SCNC: 1.2 MMOL/L (ref 0.5–2)
DEPRECATED RDW RBC AUTO: 55.1 FL (ref 37–54)
EOSINOPHIL # BLD AUTO: 0 10*3/MM3 (ref 0–0.4)
EOSINOPHIL NFR BLD AUTO: 0 % (ref 0.3–6.2)
ERYTHROCYTE [DISTWIDTH] IN BLOOD BY AUTOMATED COUNT: 14.8 % (ref 12.3–15.4)
GFR SERPL CREATININE-BSD FRML MDRD: >150 ML/MIN/1.73
GLOBULIN UR ELPH-MCNC: 2.4 GM/DL
GLUCOSE SERPL-MCNC: 123 MG/DL (ref 65–99)
HCT VFR BLD AUTO: 47 % (ref 34–46.6)
HGB BLD-MCNC: 16.5 G/DL (ref 12–15.9)
LIPASE SERPL-CCNC: 634 U/L (ref 13–60)
LIPASE SERPL-CCNC: 641 U/L (ref 13–60)
LYMPHOCYTES # BLD AUTO: 0.88 10*3/MM3 (ref 0.7–3.1)
LYMPHOCYTES NFR BLD AUTO: 7.2 % (ref 19.6–45.3)
MAGNESIUM SERPL-MCNC: 1.6 MG/DL (ref 1.6–2.6)
MCH RBC QN AUTO: 36.4 PG (ref 26.6–33)
MCHC RBC AUTO-ENTMCNC: 35.1 G/DL (ref 31.5–35.7)
MCV RBC AUTO: 103.8 FL (ref 79–97)
MONOCYTES # BLD AUTO: 0.82 10*3/MM3 (ref 0.1–0.9)
MONOCYTES NFR BLD AUTO: 6.7 % (ref 5–12)
NEUTROPHILS NFR BLD AUTO: 10.5 10*3/MM3 (ref 1.7–7)
NEUTROPHILS NFR BLD AUTO: 85.4 % (ref 42.7–76)
PHOSPHATE SERPL-MCNC: 2.9 MG/DL (ref 2.5–4.5)
PLATELET # BLD AUTO: 143 10*3/MM3 (ref 140–450)
PMV BLD AUTO: 11.9 FL (ref 6–12)
POTASSIUM SERPL-SCNC: 3.8 MMOL/L (ref 3.5–5.2)
PROT SERPL-MCNC: 5.9 G/DL (ref 6–8.5)
QT INTERVAL: 362 MS
QTC INTERVAL: 548 MS
RBC # BLD AUTO: 4.53 10*6/MM3 (ref 3.77–5.28)
SODIUM SERPL-SCNC: 142 MMOL/L (ref 136–145)
WBC # BLD AUTO: 12.28 10*3/MM3 (ref 3.4–10.8)

## 2021-09-01 PROCEDURE — 83735 ASSAY OF MAGNESIUM: CPT | Performed by: INTERNAL MEDICINE

## 2021-09-01 PROCEDURE — 25010000002 SODIUM CHLORIDE 0.9 % WITH KCL 20 MEQ 20-0.9 MEQ/L-% SOLUTION: Performed by: INTERNAL MEDICINE

## 2021-09-01 PROCEDURE — 25010000002 PROCHLORPERAZINE 10 MG/2ML SOLUTION: Performed by: INTERNAL MEDICINE

## 2021-09-01 PROCEDURE — 25010000003 HYDROMORPHONE 1 MG/ML SOLUTION: Performed by: INTERNAL MEDICINE

## 2021-09-01 PROCEDURE — 85025 COMPLETE CBC W/AUTO DIFF WBC: CPT | Performed by: INTERNAL MEDICINE

## 2021-09-01 PROCEDURE — 0 GADOBENATE DIMEGLUMINE 529 MG/ML SOLUTION: Performed by: FAMILY MEDICINE

## 2021-09-01 PROCEDURE — 25010000002 ONDANSETRON PER 1 MG: Performed by: INTERNAL MEDICINE

## 2021-09-01 PROCEDURE — 25010000002 HYDROMORPHONE PER 4 MG: Performed by: FAMILY MEDICINE

## 2021-09-01 PROCEDURE — 83690 ASSAY OF LIPASE: CPT | Performed by: INTERNAL MEDICINE

## 2021-09-01 PROCEDURE — 25010000002 LORAZEPAM PER 2 MG: Performed by: INTERNAL MEDICINE

## 2021-09-01 PROCEDURE — 83690 ASSAY OF LIPASE: CPT | Performed by: FAMILY MEDICINE

## 2021-09-01 PROCEDURE — 0 HYDROMORPHONE 1 MG/ML SOLUTION: Performed by: INTERNAL MEDICINE

## 2021-09-01 PROCEDURE — 36415 COLL VENOUS BLD VENIPUNCTURE: CPT | Performed by: INTERNAL MEDICINE

## 2021-09-01 PROCEDURE — 84100 ASSAY OF PHOSPHORUS: CPT | Performed by: INTERNAL MEDICINE

## 2021-09-01 PROCEDURE — 0 HYDROMORPHONE PER 4 MG: Performed by: INTERNAL MEDICINE

## 2021-09-01 PROCEDURE — 99223 1ST HOSP IP/OBS HIGH 75: CPT | Performed by: INTERNAL MEDICINE

## 2021-09-01 PROCEDURE — A9577 INJ MULTIHANCE: HCPCS | Performed by: FAMILY MEDICINE

## 2021-09-01 PROCEDURE — 80053 COMPREHEN METABOLIC PANEL: CPT | Performed by: INTERNAL MEDICINE

## 2021-09-01 PROCEDURE — 25010000003 HYDROMORPHONE PER 4 MG: Performed by: INTERNAL MEDICINE

## 2021-09-01 PROCEDURE — 83605 ASSAY OF LACTIC ACID: CPT | Performed by: FAMILY MEDICINE

## 2021-09-01 PROCEDURE — 74183 MRI ABD W/O CNTR FLWD CNTR: CPT

## 2021-09-01 RX ORDER — SODIUM CHLORIDE 0.9 % (FLUSH) 0.9 %
10 SYRINGE (ML) INJECTION AS NEEDED
Status: DISCONTINUED | OUTPATIENT
Start: 2021-09-01 | End: 2021-09-04 | Stop reason: HOSPADM

## 2021-09-01 RX ORDER — SODIUM CHLORIDE 0.9 % (FLUSH) 0.9 %
10 SYRINGE (ML) INJECTION EVERY 12 HOURS SCHEDULED
Status: DISCONTINUED | OUTPATIENT
Start: 2021-09-01 | End: 2021-09-04 | Stop reason: HOSPADM

## 2021-09-01 RX ORDER — ONDANSETRON 2 MG/ML
4 INJECTION INTRAMUSCULAR; INTRAVENOUS EVERY 6 HOURS PRN
Status: DISCONTINUED | OUTPATIENT
Start: 2021-09-01 | End: 2021-09-04 | Stop reason: HOSPADM

## 2021-09-01 RX ORDER — SODIUM CHLORIDE AND POTASSIUM CHLORIDE 150; 900 MG/100ML; MG/100ML
50 INJECTION, SOLUTION INTRAVENOUS CONTINUOUS
Status: DISCONTINUED | OUTPATIENT
Start: 2021-09-01 | End: 2021-09-04 | Stop reason: HOSPADM

## 2021-09-01 RX ORDER — HYDROMORPHONE HYDROCHLORIDE 1 MG/ML
0.5 INJECTION, SOLUTION INTRAMUSCULAR; INTRAVENOUS; SUBCUTANEOUS
Status: DISCONTINUED | OUTPATIENT
Start: 2021-09-01 | End: 2021-09-04 | Stop reason: HOSPADM

## 2021-09-01 RX ADMIN — PROCHLORPERAZINE EDISYLATE 2.5 MG: 5 INJECTION, SOLUTION INTRAMUSCULAR; INTRAVENOUS at 11:03

## 2021-09-01 RX ADMIN — LORAZEPAM 1 MG: 2 INJECTION INTRAMUSCULAR; INTRAVENOUS at 03:00

## 2021-09-01 RX ADMIN — HYDROMORPHONE HYDROCHLORIDE 1 MG: 1 INJECTION, SOLUTION INTRAMUSCULAR; INTRAVENOUS; SUBCUTANEOUS at 04:30

## 2021-09-01 RX ADMIN — HYDROMORPHONE HYDROCHLORIDE: 10 INJECTION, SOLUTION INTRAMUSCULAR; INTRAVENOUS; SUBCUTANEOUS at 06:02

## 2021-09-01 RX ADMIN — ONDANSETRON 4 MG: 2 INJECTION INTRAMUSCULAR; INTRAVENOUS at 07:37

## 2021-09-01 RX ADMIN — PROCHLORPERAZINE EDISYLATE 2.5 MG: 5 INJECTION, SOLUTION INTRAMUSCULAR; INTRAVENOUS at 20:29

## 2021-09-01 RX ADMIN — POTASSIUM CHLORIDE AND SODIUM CHLORIDE 100 ML/HR: 900; 150 INJECTION, SOLUTION INTRAVENOUS at 22:22

## 2021-09-01 RX ADMIN — SODIUM CHLORIDE, PRESERVATIVE FREE 10 ML: 5 INJECTION INTRAVENOUS at 20:31

## 2021-09-01 RX ADMIN — FAMOTIDINE 20 MG: 10 INJECTION INTRAVENOUS at 09:43

## 2021-09-01 RX ADMIN — HYDROMORPHONE HYDROCHLORIDE 1 MG: 1 INJECTION, SOLUTION INTRAMUSCULAR; INTRAVENOUS; SUBCUTANEOUS at 01:02

## 2021-09-01 RX ADMIN — GADOBENATE DIMEGLUMINE 13 ML: 529 INJECTION, SOLUTION INTRAVENOUS at 17:15

## 2021-09-01 RX ADMIN — POTASSIUM CHLORIDE AND SODIUM CHLORIDE 100 ML/HR: 900; 150 INJECTION, SOLUTION INTRAVENOUS at 11:45

## 2021-09-01 RX ADMIN — HYDROMORPHONE HYDROCHLORIDE 0.5 MG: 1 INJECTION, SOLUTION INTRAMUSCULAR; INTRAVENOUS; SUBCUTANEOUS at 16:11

## 2021-09-01 RX ADMIN — ONDANSETRON 4 MG: 2 INJECTION INTRAMUSCULAR; INTRAVENOUS at 17:24

## 2021-09-01 RX ADMIN — ONDANSETRON 4 MG: 2 INJECTION INTRAMUSCULAR; INTRAVENOUS at 23:50

## 2021-09-01 RX ADMIN — SODIUM CHLORIDE, PRESERVATIVE FREE 10 ML: 5 INJECTION INTRAVENOUS at 09:43

## 2021-09-01 RX ADMIN — POTASSIUM CHLORIDE AND SODIUM CHLORIDE 100 ML/HR: 900; 150 INJECTION, SOLUTION INTRAVENOUS at 01:02

## 2021-09-01 RX ADMIN — FAMOTIDINE 20 MG: 10 INJECTION INTRAVENOUS at 20:29

## 2021-09-01 NOTE — ED NOTES
Pt voiced no c/o at present will continue to hold in er due to no unit beds     Paris Henderson, RN  09/01/21 [id number]

## 2021-09-01 NOTE — H&P
"    [facility] Medicine Services  HISTORY AND PHYSICAL    Date of Admission: 8/31/2021  Primary Care Physician: Provider, No Known    Subjective     Chief Complaint: Abdominal pain    History of Present Illness  30-year-old female who presents to the emergency department abdominal pain.  She describes the pain as a boring type of pain that starts in the front and goes to the back.  The patient initially thought that she was recovering from Salmonella, as her whole and family had a Salmonella infection.  She states that her condition worsened over the last 2 days.  She has had intractable nausea and vomiting, with diarrhea.  Patient states that she does drink alcohol.  She states that she had stopped for a couple weeks, but had some family issues and started again.  She states she does not feel like she will go through alcohol withdrawal.  She is had no blood in her emesis.  She is had no chest pain.  She has had no acute breathing issues.        Review of Systems   Abdominal pain  NVD    Otherwise complete ROS reviewed and negative except as mentioned in the HPI.    Past Medical History:   Past Medical History:   Diagnosis Date   • Ovarian cyst    • Stomach ulcer      Past Surgical History:  Past Surgical History:   Procedure Laterality Date   • OVARIAN CYST REMOVAL       Social History:  reports that she has quit smoking. She does not have any smokeless tobacco history on file. She reports current alcohol use. She reports current drug use. Drug: Marijuana.    Family History: None    Allergies:  No Known Allergies    Medications:  Prior to Admission medications    Not on File     I have utilized all available immediate resources to obtain, update, and review the patient's current medications.    Objective     Vital Signs: /98   Pulse (!) 124   Temp 98.1 °F (36.7 °C) (Oral)   Resp 20   Ht 170.2 cm (67\")   Wt 67.1 kg (148 lb)   LMP 08/16/2021   SpO2 96%   BMI 23.18 kg/m² "   Physical Exam  Vitals reviewed.   Constitutional:       Appearance: She is ill-appearing.   HENT:      Head: Normocephalic and atraumatic.      Nose: Nose normal.   Eyes:      General: No scleral icterus.     Conjunctiva/sclera: Conjunctivae normal.   Cardiovascular:      Rate and Rhythm: Regular rhythm. Tachycardia present.   Pulmonary:      Effort: Pulmonary effort is normal. No respiratory distress.   Abdominal:      Palpations: Abdomen is soft.      Tenderness: There is abdominal tenderness.   Musculoskeletal:         General: No swelling or tenderness.      Cervical back: Normal range of motion and neck supple.   Skin:     General: Skin is warm and dry.   Neurological:      General: No focal deficit present.      Mental Status: She is alert.      Cranial Nerves: No cranial nerve deficit.   Psychiatric:         Mood and Affect: Mood normal.         Behavior: Behavior normal.        Results Reviewed:  Lab Results (last 24 hours)     Procedure Component Value Units Date/Time    Ethanol [[id number]] Collected: 08/31/21 2007    Specimen: Blood Updated: 08/31/21 2042     Ethanol % 0.099 %     Narrative:      Not for legal purposes. Chain of Custody not followed.     aPTT [[id number]]  (Normal) Collected: 08/31/21 2007    Specimen: Blood Updated: 08/31/21 2036     PTT 24.9 seconds     Protime-INR [[id number]]  (Abnormal) Collected: 08/31/21 2007    Specimen: Blood Updated: 08/31/21 2036     Protime 13.9 Seconds      INR 1.16    STAT Lactic Acid, Reflex [[id number]] Collected: 08/31/21 2007    Specimen: Blood Updated: 08/31/21 2019    Hepatitis Panel, Acute [[id number]] Collected: 08/31/21 2007    Specimen: Blood Updated: 08/31/21 2019    Procalcitonin [[id number]]  (Normal) Collected: 08/31/21 1606    Specimen: Blood Updated: 08/31/21 1943     Procalcitonin 0.03 ng/mL     Narrative:      As a Marker for Sepsis (Non-Neonates):     1. <0.5 ng/mL represents a low risk of severe sepsis and/or septic shock.  2. >2 ng/mL  "represents a high risk of severe sepsis and/or septic shock.    As a Marker for Lower Respiratory Tract Infections that require antibiotic therapy:  PCT on Admission     Antibiotic Therapy             6-12 Hrs later  >0.5                          Strongly Recommended            >0.25 - <0.5             Recommended  0.1 - 0.25                  Discouraged                       Remeasure/reassess PCT  <0.1                         Strongly Discouraged         Remeasure/reassess PCT      As 28 day mortality risk marker: \"Change in Procalcitonin Result\" (>80% or <=80%) if Day 0 (or Day 1) and Day 4 values are available. Refer to http://www.Apollidonpct-calculator.com/    Change in PCT <=80 %   A decrease of PCT levels below or equal to 80% defines a positive change in PCT test result representing a higher risk for 28-day all-cause mortality of patients diagnosed with severe sepsis or septic shock.    Change in PCT >80 %   A decrease of PCT levels of more than 80% defines a negative change in PCT result representing a lower risk for 28-day all-cause mortality of patients diagnosed with severe sepsis or septic shock.                Ferritin [[id number]]  (Abnormal) Collected: 08/31/21 1606    Specimen: Blood Updated: 08/31/21 1941     Ferritin 1,048.00 ng/mL     Narrative:      Results may be falsely decreased if patient taking Biotin.      Lactate Dehydrogenase [[id number]]  (Abnormal) Collected: 08/31/21 1606    Specimen: Blood Updated: 08/31/21 1935      U/L     COVID-19,[facility] IN-HOUSE,Nasal Swab No Transport Media 3-4 HR TAT - Swab, Nasal Cavity [[id number]]  (Abnormal) Collected: 08/31/21 1706    Specimen: Swab from Nasal Cavity Updated: 08/31/21 1829     COVID19 Detected    Narrative:      Fact sheet for providers: https://www.fda.gov/media/999187/download     Fact sheet for patients: https://www.fda.gov/media/806928/download    Test performed by PCR.    Consider negative results in combination with clinical " observations, patient history, and epidemiological information.    Lactic Acid, Plasma [[id number]]  (Abnormal) Collected: 08/31/21 1703    Specimen: Blood Updated: 08/31/21 1742     Lactate 7.7 mmol/L     Blood Culture - Blood, Arm, Left [[id number]] Collected: 08/31/21 1703    Specimen: Blood from Arm, Left Updated: 08/31/21 1721    Blood Culture - Blood, Arm, Right [[id number]] Collected: 08/31/21 1704    Specimen: Blood from Arm, Right Updated: 08/31/21 1721    [place] Draw [[id number]] Collected: 08/31/21 1606    Specimen: Blood Updated: 08/31/21 1715    Narrative:      The following orders were created for panel order [place] Draw.  Procedure                               Abnormality         Status                     ---------                               -----------         ------                     Green Top (Gel)[[id number]]                                  Final result               Lavender Top[[id number]]                                     Final result               Red Top[[id number]]                                          Final result               Light Blue Top[[id number]]                                   Final result                 Please view results for these tests on the individual orders.    Lavender Top [[id number]] Collected: 08/31/21 1606    Specimen: Blood Updated: 08/31/21 1715     Extra Tube hold for add-on     Comment: Auto resulted       Green Top (Gel) [[id number]] Collected: 08/31/21 1606    Specimen: Blood Updated: 08/31/21 1715     Extra Tube Hold for add-ons.     Comment: Auto resulted.       Red Top [[id number]] Collected: 08/31/21 1606    Specimen: Blood Updated: 08/31/21 1715     Extra Tube Hold for add-ons.     Comment: Auto resulted.       Light Blue Top [[id number]] Collected: 08/31/21 1606    Specimen: Blood Updated: 08/31/21 1715     Extra Tube hold for add-on     Comment: Auto resulted       Lipase [[id number]]  (Abnormal) Collected: 08/31/21 1606    Specimen: Blood Updated:  08/31/21 1710     Lipase 1,290 U/L     Comprehensive Metabolic Panel [[id number]]  (Abnormal) Collected: 08/31/21 1606    Specimen: Blood Updated: 08/31/21 1659     Glucose 107 mg/dL      BUN 8 mg/dL      Creatinine 0.50 mg/dL      Sodium 139 mmol/L      Potassium 3.4 mmol/L      Chloride 90 mmol/L      CO2 16.0 mmol/L      Calcium 9.4 mg/dL      Total Protein 7.5 g/dL      Albumin 4.40 g/dL      ALT (SGPT) 114 U/L      AST (SGOT) 253 U/L      Alkaline Phosphatase 130 U/L      Total Bilirubin 1.7 mg/dL      eGFR Non African Amer 146 mL/min/1.73      eGFR  African Amer >150 mL/min/1.73      Globulin 3.1 gm/dL      A/G Ratio 1.4 g/dL      BUN/Creatinine Ratio 16.0     Anion Gap 33.0 mmol/L     Narrative:      GFR Normal >60  Chronic Kidney Disease <60  Kidney Failure <15      Urinalysis, Microscopic Only - Urine, Clean Catch [[id number]]  (Abnormal) Collected: 08/31/21 1610    Specimen: Urine, Clean Catch Updated: 08/31/21 1657     RBC, UA 0-2 /HPF      WBC, UA 3-5 /HPF      Bacteria, UA 3+ /HPF      Squamous Epithelial Cells, UA 7-12 /HPF      Hyaline Casts, UA Too Numerous to Count /LPF      Methodology Manual Light Microscopy    Urinalysis With Microscopic If Indicated (No Culture) - Urine, Clean Catch [[id number]]  (Abnormal) Collected: 08/31/21 1610    Specimen: Urine, Clean Catch Updated: 08/31/21 1643     Color, UA [place]     Appearance, UA Cloudy     pH, UA <=5.0     Specific Gravity, UA >1.030     Glucose, UA Negative     Ketones, UA 15 mg/dL (1+)     Bilirubin, UA Moderate (2+)     Blood, UA Trace     Protein,  mg/dL (2+)     Leuk Esterase, UA Trace     Nitrite, UA Positive     Urobilinogen, UA 1.0 E.U./dL    Narrative:      Dipstick results may be inaccurate due to color interference.    CBC & Differential [[id number]]  (Abnormal) Collected: 08/31/21 1606    Specimen: Blood Updated: 08/31/21 1636    Narrative:      The following orders were created for panel order CBC & Differential.  Procedure                                Abnormality         Status                     ---------                               -----------         ------                     CBC Auto Differential[[id number]]        Abnormal            Final result                 Please view results for these tests on the individual orders.    CBC Auto Differential [[id number]]  (Abnormal) Collected: 08/31/21 1606    Specimen: Blood Updated: 08/31/21 1636     WBC 16.14 10*3/mm3      RBC 5.15 10*6/mm3      Hemoglobin 18.9 g/dL      Hematocrit 53.0 %      .9 fL      MCH 36.7 pg      MCHC 35.7 g/dL      RDW 14.6 %      RDW-SD 53.3 fl      MPV 11.3 fL      Platelets 224 10*3/mm3      Neutrophil % 85.5 %      Lymphocyte % 7.0 %      Monocyte % 6.8 %      Eosinophil % 0.0 %      Basophil % 0.3 %      Immature Grans % 0.4 %      Neutrophils, Absolute 13.81 10*3/mm3      Lymphocytes, Absolute 1.13 10*3/mm3      Monocytes, Absolute 1.09 10*3/mm3      Eosinophils, Absolute 0.00 10*3/mm3      Basophils, Absolute 0.05 10*3/mm3      Immature Grans, Absolute 0.06 10*3/mm3      nRBC 0.0 /100 WBC     POCT pregnancy, urine [[id number]]  (Normal) Collected: 08/31/21 1613    Specimen: Urine Updated: 08/31/21 1614     HCG, Urine, QL Negative     Lot Number \[id number]\     Internal Positive Control Positive     Internal Negative Control Negative        Imaging Results (Last 24 Hours)     Procedure Component Value Units Date/Time    CT Abdomen Pelvis With Contrast [[id number]] Collected: 08/31/21 1733     Updated: 08/31/21 1749    Narrative:      CT ABDOMEN PELVIS W CONTRAST- 8/31/2021 5:19 PM CDT     HISTORY: abdominal pain; low back pain       COMPARISON: None.      DLP: 236 mGy cm. Automated exposure control was utilized to diminish  patient radiation dose.     TECHNIQUE: Following the intravenous administration of contrast, helical  CT tomographic images of the abdomen and pelvis were acquired. Coronal  reformatted images were also provided for review.      FINDINGS:    There is a pectus excavatum deformity of the lower sternum. The lung  bases are clear. The base of the heart is unremarkable..      LIVER: No focal liver lesion. The hepatic vasculature is patent. There  is severe steatosis of the liver.     BILIARY SYSTEM: The gallbladder is normal in caliber without definite  stone. There is some enhancement of the gallbladder wall. There is no  evidence of biliary dilatation..      PANCREAS: There is normal enhancement of the pancreas without evidence  of mass or pancreatic necrosis. However, there is fluid/edema  surrounding the pancreatic gland within the anterior pararenal space.  There is also fluid and mild induration within the greater omentum and  within the lesser sac. I would favor severe pancreatitis with  phlegmonous change. No discrete fluid collection is present. There is a  small amount of ascites within the pelvis and paracolic gutters..      SPLEEN: Unremarkable.      KIDNEYS AND ADRENALS: Bilateral kidneys and adrenal glands are  unremarkable. The ureters are decompressed and normal in appearance.     RETROPERITONEUM: No mass, lymphadenopathy or hemorrhage.      GI TRACT: No evidence of obstruction or bowel wall thickening. The  appendix is visualized and unremarkable.     OTHER: There is mild phlegmonous induration of the greater omentum.  There is also some mild inflammatory edematous change at the root of the  small bowel mesentery. No evidence of mesenteric mass.. The  abdominopelvic vasculature is patent. The osseous structures and soft  tissues demonstrate no worrisome lesions. A small fat-containing  periumbilical hernia is present.      PELVIS: The uterus and adnexa are remarkable for a dominant follicle or  small right ovarian cyst measuring 1.5 cm in size. There is some free  fluid in the cul-de-sac.. The urinary bladder is normal in appearance.       Impression:      1. There is extensive fluid density and edematous change surrounding the  pancreatic  gland in the anterior pararenal space. This extends into the  upper left paracolic gutter with associated thickening of the lateral  conal fascia and left anterior renal fascia. There is also  phlegmonous-like edematous change within the greater omentum. There is  some edema as well as a small amount of fluid within the lesser sac  along the lesser curvature of the stomach. There is ascites within the  paracolic gutters and pelvis. FINDINGS would suggest rather severe  pancreatitis. There is no evidence of pancreatic necrosis with  homogeneous enhancement of the pancreatic gland. No other complicating  features are appreciated.  2. Severe steatosis of the liver.  3. No evidence of nephrolithiasis or obstructive uropathy. There is a  normal bowel gas pattern.  4. Dominant follicle or small right ovarian cyst. A small amount of free  fluid within the cul-de-sac is likely related to the previously  described pancreatitis..         This report was finalized on 08/31/2021 17:46 by Dr. Max Shah MD.        I have personally reviewed and interpreted the radiology studies and ECG obtained at time of admission.     Assessment / Plan     Assessment:   Active Hospital Problems    Diagnosis    • Alcohol-induced acute pancreatitis without infection or necrosis      Impression:  1.  Acute alcoholic pancreatitis  2.  Lactic acidosis with anion gap  3.  Nausea and vomiting  4.  UTI  5.  Hypokalemia  6.  Covid 19    Plan:   1.  Pain control  2.  Telemetry  3.  GI consult  4.  N.p.o.  5.  Pepcid  6.  Labs in the morning  7.  Levaquin  8.  O2 support if needed  9.  Nausea control  10.  IV fluids with potassium      Code Status/Advanced Care Plan: Full    The patient's surrogate decision maker is family.     I discussed my findings and recommendations with the patient.    Estimated length of stay is 3 to 4 days.     The patient was seen and examined by me on 8/31/2021 at 830.    Electronically signed by Roxanna Kirby DO,  08/31/21, 20:51 CDT.

## 2021-09-01 NOTE — PLAN OF CARE
Goal Outcome Evaluation:  Plan of Care Reviewed With: other (see comments) (RN)   Progress: no change  Outcome Summary: Ntn assessment completed. Pt with decreased appetite and weight loss per ntn risk screen upon admission. NPO w/ ice chips. Await diet advancement. Cont to follow for plan of care.

## 2021-09-01 NOTE — CONSULTS
Inpatient Gastroenterology Service    Consultation    Mac De Leon MD, FACP        Patient referred for evaluation of abdominal pain  History is obtained from  the EMR, the referring physician        Chief complaint  -abdominal pain        History of Present Illness  -patient presented with gradual onset of severe abdominal pain with associated nausea, emesis, and diarrhea.  She had a recent episode of salmonella, but failed to obtain her prescriptions for this.    -denies hematemesis, hematochezia, melena, constipation, pyrosis, regurgitation, dysphagia, odynophagia, choluria, acholic stool, or jaundice        Past Medical History  -PUD        Past Surgical History  -none        Past Endoscopy History  -no prior EGD  -no prior colonoscopy        Past Anesthesia/Sedation History  -no prior anesthesia        Past Procedural History  -heart cath: 0  -coronary artery stents: 0        Family History  -no report of family history of: gastric cancer, pancreatic cancer, colorectal cancer, breast cancer, ovarian cancer, uterine cancer, colorectal polyps, inflammatory bowel disease, Crohn's disease, ulcerative colitis, celiac disease, peptic ulcer disease, pancreatitis, hepatitis, cirrhosis        Social History  -  -tobacco use: quit  -ethanol use: (+)  -illicit/recreational substance and THC use: (+)THC        Medications  -see EMR lists        Physical Exam  (Curtailed/obtained from EMR in light of COVID status)General  -appears stated age  -no acute distress  -no outstanding physical abnormality    HEENT  -normocephalic  -atraumatic  -no drainage  -no bleeding  -mucosa appears moist     Neurological  -alert  -oriented  -normal speech  -no slowed response  -no tremor  -no obvious focal deficit  -moves all extremities without obvious abnormality    Psychiatric  -normal mood  -appropriate responses to questions  -no unusual behaviors noted    Skin (evaluation limited to exposed skin)  -dry  -no  diaphoresis  -no icterus  -no rash     Pulmonary  -normal respiratory effort  -no respiratory distress  -no stridor    Cardiovascular  -normal rate  -regular rhythm    Abdomen  -benign  -soft  -mild chuy-umbilical tenderness to direct exam        Laboratory of Note  -see EMR  -lipase 1290 >>> 634  -, ALT 77, bili1.8  -viral hepatitis panel negative for A/B/C        Imaging of Note  -see EMR  -CT abd:  IMPRESSION:  1. There is extensive fluid density and edematous change surrounding the  pancreatic gland in the anterior pararenal space. This extends into the  upper left paracolic gutter with associated thickening of the lateral  conal fascia and left anterior renal fascia. There is also  phlegmonous-like edematous change within the greater omentum. There is  some edema as well as a small amount of fluid within the lesser sac  along the lesser curvature of the stomach. There is ascites within the  paracolic gutters and pelvis. FINDINGS would suggest rather severe  pancreatitis. There is no evidence of pancreatic necrosis with  homogeneous enhancement of the pancreatic gland. No other complicating  features are appreciated.  2. Severe steatosis of the liver.  3. No evidence of nephrolithiasis or obstructive uropathy. There is a  normal bowel gas pattern.  4. Dominant follicle or small right ovarian cyst. A small amount of free  fluid within the cul-de-sac is likely related to the previously  described pancreatitis..   This report was finalized on 08/31/2021 17:46 by Dr. Max Shah MD.    -MRI abd  IMPRESSION:  1.. Extensive pancreatitis with phlegmonous fluid/edema surrounding the  pancreatic gland within the anterior pararenal space. There is also  reactive ascites with fluid in the perihepatic and perisplenic space as  well as within the upper paracolic gutters. The volume of ascitic fluid  has shown some increase from the previous exam. I do not see evidence of  pancreatic necrosis or discrete  pancreatic mass.  2. Severe steatosis of the liver with marked dropout of signal in  comparing in and out of phase sequencing through the liver. I do not see  evidence of a discrete focal hepatic mass.  3. MRCP images are essentially unremarkable with no evidence of intra or  extrahepatic biliary dilatation. No discrete stricture or intraluminal  filling defect is demonstrated. The pancreatic duct is normal in  caliber.  4. No foci of abnormal contrast enhancement are demonstrated. No  evidence of pancreatic necrosis.  This report was finalized on 09/01/2021 17:56 by Dr. Max Shah MD.          Assessment  -probable alcoholic pancreatitis with local complications (moderate to severe).  Imaging suggests severe disease, as does SIRS score.  Smoking increases the risk of relapse more than continued ethanol use, but obviously both should be avoided.  PRSS-1 hereditary pancreatitis usually presents in the third decade of life, so this would be a possibility, but with her history, ethanol is the more likely etiology.  CPA1 and CLDN2 mutations are more common in men with alcoholic pancreatitis.  -[place]'s criteria +2, SIRS (+)  -acute alcoholic hepatitis.  Hyperbilirubinemia may persist for weeks or months, and is not an indicator of clinical course.  ·  Maddrey's DF = 10.5, unlikely to benefit from steroids       ·  Lille score = pending day-seven labs  ·  MELD = 10 (6% three-month mortality)  -liver function is modestly impaired, as is expected in alcoholic hepatitis.  Patient does not meet criteria for acute liver failure.    -COVID        Recommendations  -supportive care: pain management and vigorous IV hydration, careful attention to electrolytes, etc.  -multivitamins and especially antioxidant vitamins have (limited) clinical data suggesting improved clinical outcomes in the long term (>10 years)  -complete abstinence from drinking ethanol and smoking tobacco  -consider referral for genetic testing post  convalescence  -regular (annual?) screening for pancreatic cancer should be considered  -would also consider repeat pancreatic imaging in three months to assess for small masses which might have been obscured due to active inflammatory process  -would recommend PCA pump/narcotic analgesia as needed for pain control (per UpToDate recommendations)  -consider obtaining MEGHNA and IgG4 as screen for autoimmune pancreatitis (very rare in my experience)  -acid suppression: IV PPi  -avoid agents toxic to GI tract mucosa  -NPO with ice chips and sips of water until pain abates.  Early re-feeding has proponents, however, the supportive data is not overwhelming.  -once pain subsides, clear liquids, than advance diet slowly as tolerated to low fat diet  -no indication for gastrointestinal endoscopy at this time  -encourage AA attendance  -serum lipid panel to include triglyceride level  -DT precautions  -supplement multivitamins, particularly thiamine  -post convalescence, at [facility], recheck CMP, consider MEGHNA, AMA, ASmA, alpha-1 antitrypsin, serum copper, ceruloplasmin, iron, TIBC, iron sat, ferritin, AFP  -follow-up office visit in GI clinic office in four weeks        Thank you for allowing us to participate in the care of this patient.    Sincerely,    SAIDA De Leon MD, FACP  P Inpatient Gastroenterology Service

## 2021-09-01 NOTE — ED NOTES
Informed pt of needing another form of pain medication orders,   sheri Pierre informed er does not do pca pumps     Paris Henderson, RN  08/31/21 1697

## 2021-09-01 NOTE — CASE MANAGEMENT/SOCIAL WORK
Discharge Planning Assessment  [facility]     Patient Name: Shwetha Tobias  MRN: [id number]  Today's Date: 9/1/2021    Admit Date: 8/31/2021    Discharge Needs Assessment     Row Name 09/01/21 0947       Living Environment    Lives With  spouse    Name(s) of Who Lives With Patient  Jose    Current Living Arrangements  home/apartment/condo    Primary Care Provided by  spouse/significant other    Provides Primary Care For  child(yohana)    Caregiving Concerns  5 year old    Family Caregiver if Needed  none    Quality of Family Relationships  helpful;involved;supportive       Resource/Environmental Concerns    Resource/Environmental Concerns  financial    Financial Concerns  insurance, none;medicine, unable to afford    Transportation Concerns  car, none       Transition Planning    Patient/Family Anticipates Transition to  home with family    Transportation Anticipated  family or friend will provide       Discharge Needs Assessment    Readmission Within the Last 30 Days  no previous admission in last 30 days    Equipment Currently Used at Home  none    Concerns to be Addressed  no discharge needs identified    Anticipated Changes Related to Illness  none    Equipment Needed After Discharge  none    Discharge Coordination/Progress  spoke to spouse Jose; lives with spouse and 5 year old son; gave number to [employer] to spouse to assist with RX and medical bills; will need set up with PCP upon DC will follow for DC needs        Discharge Plan    No documentation.       Continued Care and Services - Admitted Since 8/31/2021    Coordination has not been started for this encounter.         Demographic Summary    No documentation.       Functional Status    No documentation.       Psychosocial    No documentation.       Abuse/Neglect    No documentation.       Legal    No documentation.       Substance Abuse    No documentation.       Patient Forms    No documentation.           Juanita Michaud RN

## 2021-09-01 NOTE — PROGRESS NOTES
[facility]  INPATIENT PROGRESS NOTE    Length of Stay: 1  Date of Admission: 8/31/2021  Primary Care Physician: Provider, No Known    Subjective     Chief Complaint:     Abdominal discomfort, incidentally positive Covid screen    HPI     Patient is abdominal pain persists.  She has awake, alert and conversational.  She appears bright and interactive.  Lactate has normalized.  White blood cell count has decreased to 12,300.  CMP shows total protein 5.9, ALT 77, , total bilirubin 1.8.  Drug screen was positive for THC, opiates and benzodiazepines.  Lipase decreased to 634 from an admission value of 1290.  Pain control is achieved with a Dilaudid PCA.  The patient has had no evidence of alcohol withdrawal to this point.  She continues to receive scheduled Ativan and Dilaudid both via PCA and as needed.  I believe at this point, scheduled Ativan can be discontinued and she can continue on the as needed CIWA Ativan protocol.     Review of Systems     All pertinent negatives and positives are as above. All other systems have been reviewed and are negative unless otherwise stated.     Objective    Temp:  [96.6 °F (35.9 °C)-98.1 °F (36.7 °C)] 97 °F (36.1 °C)  Heart Rate:  [116-157] 116  Resp:  [16-20] 20  BP: (119-147)/() 147/109    Lab Results (last 24 hours)     Procedure Component Value Units Date/Time    Lactic Acid, Plasma [[id number]]  (Normal) Collected: 09/01/21 0931    Specimen: Blood Updated: 09/01/21 1012     Lactate 1.2 mmol/L     Lipase [[id number]]  (Abnormal) Collected: 09/01/21 0620    Specimen: Blood Updated: 09/01/21 0735     Lipase 634 U/L     Comprehensive Metabolic Panel [[id number]]  (Abnormal) Collected: 09/01/21 0620    Specimen: Blood Updated: 09/01/21 0726     Glucose 123 mg/dL      BUN 6 mg/dL      Creatinine 0.47 mg/dL      Sodium 142 mmol/L      Potassium 3.8 mmol/L      Chloride 97 mmol/L      CO2 24.0 mmol/L      Calcium 8.4  mg/dL      Total Protein 5.9 g/dL      Albumin 3.50 g/dL      ALT (SGPT) 77 U/L      AST (SGOT) 112 U/L      Alkaline Phosphatase 94 U/L      Total Bilirubin 1.8 mg/dL      eGFR Non African Amer >150 mL/min/1.73      Globulin 2.4 gm/dL      A/G Ratio 1.5 g/dL      BUN/Creatinine Ratio 12.8     Anion Gap 21.0 mmol/L     Narrative:      GFR Normal >60  Chronic Kidney Disease <60  Kidney Failure <15      Phosphorus [[id number]]  (Normal) Collected: 09/01/21 0620    Specimen: Blood Updated: 09/01/21 0724     Phosphorus 2.9 mg/dL     Magnesium [[id number]]  (Normal) Collected: 09/01/21 0620    Specimen: Blood Updated: 09/01/21 0721     Magnesium 1.6 mg/dL     CBC Auto Differential [[id number]]  (Abnormal) Collected: 09/01/21 0620    Specimen: Blood Updated: 09/01/21 0710     WBC 12.28 10*3/mm3      RBC 4.53 10*6/mm3      Hemoglobin 16.5 g/dL      Hematocrit 47.0 %      .8 fL      MCH 36.4 pg      MCHC 35.1 g/dL      RDW 14.8 %      RDW-SD 55.1 fl      MPV 11.9 fL      Platelets 143 10*3/mm3      Neutrophil % 85.4 %      Lymphocyte % 7.2 %      Monocyte % 6.7 %      Eosinophil % 0.0 %      Basophil % 0.2 %      Neutrophils, Absolute 10.50 10*3/mm3      Lymphocytes, Absolute 0.88 10*3/mm3      Monocytes, Absolute 0.82 10*3/mm3      Eosinophils, Absolute 0.00 10*3/mm3      Basophils, Absolute 0.02 10*3/mm3     Narrative:      Results repeated to confirm    Urine Drug Screen - Urine, Clean Catch [[id number]]  (Abnormal) Collected: 08/31/21 2309    Specimen: Urine, Clean Catch Updated: 08/31/21 2333     THC, Screen, Urine Positive     Phencyclidine (PCP), Urine Negative     Cocaine Screen, Urine Negative     Methamphetamine, Ur Negative     Opiate Screen Positive     Amphetamine Screen, Urine Negative     Benzodiazepine Screen, Urine Positive     Tricyclic Antidepressants Screen Negative     Methadone Screen, Urine Negative     Barbiturates Screen, Urine Negative     Oxycodone Screen, Urine Negative     Propoxyphene Screen  Negative     Buprenorphine, Screen, Urine Negative    Narrative:      Cutoff For Drugs Screened:    Amphetamines               500 ng/ml  Barbiturates               200 ng/ml  Benzodiazepines            150 ng/ml  Cocaine                    150 ng/ml  Methadone                  200 ng/ml  Opiates                    100 ng/ml  Phencyclidine               25 ng/ml  THC                            50 ng/ml  Methamphetamine            500 ng/ml  Tricyclic Antidepressants  300 ng/ml  Oxycodone                  100 ng/ml  Propoxyphene               300 ng/ml  Buprenorphine               10 ng/ml    The normal value for all drugs tested is negative. This report includes unconfirmed screening results, with the cutoff values listed, to be used for medical treatment purposes only.  Unconfirmed results must not be used for non-medical purposes such as employment or legal testing.  Clinical consideration should be applied to any drug of abuse test, particularly when unconfirmed results are used.      Hepatitis Panel, Acute [[id number]]  (Normal) Collected: 08/31/21 2007    Specimen: Blood Updated: 08/31/21 2215     Hepatitis B Surface Ag Non-Reactive     Hep A IgM Non-Reactive     Hep B C IgM Non-Reactive     Hepatitis C Ab Non-Reactive    Narrative:      Results may be falsely decreased if patient taking Biotin.     STAT Lactic Acid, Reflex [[id number]]  (Abnormal) Collected: 08/31/21 2007    Specimen: Blood Updated: 08/31/21 2100     Lactate 5.5 mmol/L     Ethanol [[id number]] Collected: 08/31/21 2007    Specimen: Blood Updated: 08/31/21 2042     Ethanol % 0.099 %     Narrative:      Not for legal purposes. Chain of Custody not followed.     aPTT [[id number]]  (Normal) Collected: 08/31/21 2007    Specimen: Blood Updated: 08/31/21 2036     PTT 24.9 seconds     Protime-INR [[id number]]  (Abnormal) Collected: 08/31/21 2007    Specimen: Blood Updated: 08/31/21 2036     Protime 13.9 Seconds      INR 1.16    Procalcitonin [[id number]]   "(Normal) Collected: 08/31/21 1606    Specimen: Blood Updated: 08/31/21 1943     Procalcitonin 0.03 ng/mL     Narrative:      As a Marker for Sepsis (Non-Neonates):     1. <0.5 ng/mL represents a low risk of severe sepsis and/or septic shock.  2. >2 ng/mL represents a high risk of severe sepsis and/or septic shock.    As a Marker for Lower Respiratory Tract Infections that require antibiotic therapy:  PCT on Admission     Antibiotic Therapy             6-12 Hrs later  >0.5                          Strongly Recommended            >0.25 - <0.5             Recommended  0.1 - 0.25                  Discouraged                       Remeasure/reassess PCT  <0.1                         Strongly Discouraged         Remeasure/reassess PCT      As 28 day mortality risk marker: \"Change in Procalcitonin Result\" (>80% or <=80%) if Day 0 (or Day 1) and Day 4 values are available. Refer to http://www.[employer] [facility]-pct-calculator.com/    Change in PCT <=80 %   A decrease of PCT levels below or equal to 80% defines a positive change in PCT test result representing a higher risk for 28-day all-cause mortality of patients diagnosed with severe sepsis or septic shock.    Change in PCT >80 %   A decrease of PCT levels of more than 80% defines a negative change in PCT result representing a lower risk for 28-day all-cause mortality of patients diagnosed with severe sepsis or septic shock.                Ferritin [[id number]]  (Abnormal) Collected: 08/31/21 1606    Specimen: Blood Updated: 08/31/21 1941     Ferritin 1,048.00 ng/mL     Narrative:      Results may be falsely decreased if patient taking Biotin.      Lactate Dehydrogenase [[id number]]  (Abnormal) Collected: 08/31/21 1606    Specimen: Blood Updated: 08/31/21 1935      U/L     COVID-19,[facility] IN-HOUSE,Nasal Swab No Transport Media 3-4 HR TAT - Swab, Nasal Cavity [[id number]]  (Abnormal) Collected: 08/31/21 1706    Specimen: Swab from Nasal Cavity Updated: 08/31/21 1829     COVID19 " Detected    Narrative:      Fact sheet for providers: https://www.fda.gov/media/589265/download     Fact sheet for patients: https://www.fda.gov/media/245535/download    Test performed by PCR.    Consider negative results in combination with clinical observations, patient history, and epidemiological information.          Imaging Results (Last 24 Hours)     ** No results found for the last 24 hours. **             Intake/Output Summary (Last 24 hours) at 9/1/2021 1316  Last data filed at 9/1/2021 1200  Gross per 24 hour   Intake 1096.96 ml   Output 300 ml   Net 796.96 ml       Physical Exam  Constitutional:       General: She is not in acute distress.     Appearance: Normal appearance.   HENT:      Head: Normocephalic and atraumatic.      Right Ear: External ear normal.      Left Ear: External ear normal.      Nose: Nose normal.      Mouth/Throat:      Mouth: Mucous membranes are moist.      Pharynx: Oropharynx is clear.   Eyes:      General: No scleral icterus.     Conjunctiva/sclera: Conjunctivae normal.   Cardiovascular:      Rate and Rhythm: Normal rate and regular rhythm.      Pulses: Normal pulses.      Heart sounds: Normal heart sounds.   Pulmonary:      Effort: Pulmonary effort is normal.      Breath sounds: Normal breath sounds.   Abdominal:      General: Abdomen is flat.      Palpations: Abdomen is soft.      Tenderness: There is abdominal tenderness in the epigastric area.   Musculoskeletal:         General: Normal range of motion.      Right lower leg: No edema.      Left lower leg: No edema.   Skin:     General: Skin is warm and dry.   Neurological:      General: No focal deficit present.      Mental Status: She is alert and oriented to person, place, and time. Mental status is at baseline.   Psychiatric:         Mood and Affect: Mood normal.         Judgment: Judgment normal.       Results Review:  I have reviewed the labs, radiology results, and diagnostic studies since my last progress note and made  treatment changes reflective of the results.   I have reviewed the current medications.    Assessment/Plan     Active Hospital Problems    Diagnosis    • **Alcohol-induced acute pancreatitis without infection or necrosis    • COVID-19 virus detected    • Hyperbilirubinemia    • Abnormal transaminases        PLAN:  Remain n.p.o. except for ice chips for comfort  Continue current pain management  MRCP today to further assess for possible pancreatic necrosis and to assess pancreatic ducts and hepatic biliary system.  GI consultation  If no evidence of ductal obstruction, patient can likely transfer to the medical surgical floor    Electronically signed by Jm Garza DO, 09/01/21, 13:16 CDT.

## 2021-09-02 LAB
ALBUMIN SERPL-MCNC: 3 G/DL (ref 3.5–5.2)
ALBUMIN/GLOB SERPL: 1.4 G/DL
ALP SERPL-CCNC: 78 U/L (ref 39–117)
ALT SERPL W P-5'-P-CCNC: 49 U/L (ref 1–33)
ANION GAP SERPL CALCULATED.3IONS-SCNC: 12 MMOL/L (ref 5–15)
AST SERPL-CCNC: 69 U/L (ref 1–32)
BASOPHILS # BLD AUTO: 0.01 10*3/MM3 (ref 0–0.2)
BASOPHILS NFR BLD AUTO: 0.1 % (ref 0–1.5)
BILIRUB SERPL-MCNC: 1.6 MG/DL (ref 0–1.2)
BUN SERPL-MCNC: 4 MG/DL (ref 6–20)
BUN/CREAT SERPL: 11.4 (ref 7–25)
CALCIUM SPEC-SCNC: 8.4 MG/DL (ref 8.6–10.5)
CHLORIDE SERPL-SCNC: 103 MMOL/L (ref 98–107)
CO2 SERPL-SCNC: 26 MMOL/L (ref 22–29)
CREAT SERPL-MCNC: 0.35 MG/DL (ref 0.57–1)
DEPRECATED RDW RBC AUTO: 57.2 FL (ref 37–54)
EOSINOPHIL # BLD AUTO: 0.02 10*3/MM3 (ref 0–0.4)
EOSINOPHIL NFR BLD AUTO: 0.2 % (ref 0.3–6.2)
ERYTHROCYTE [DISTWIDTH] IN BLOOD BY AUTOMATED COUNT: 14.9 % (ref 12.3–15.4)
GFR SERPL CREATININE-BSD FRML MDRD: >150 ML/MIN/1.73
GLOBULIN UR ELPH-MCNC: 2.2 GM/DL
GLUCOSE SERPL-MCNC: 89 MG/DL (ref 65–99)
HCT VFR BLD AUTO: 39.3 % (ref 34–46.6)
HGB BLD-MCNC: 13.4 G/DL (ref 12–15.9)
LIPASE SERPL-CCNC: 258 U/L (ref 13–60)
LYMPHOCYTES # BLD AUTO: 1.02 10*3/MM3 (ref 0.7–3.1)
LYMPHOCYTES NFR BLD AUTO: 11.3 % (ref 19.6–45.3)
MCH RBC QN AUTO: 36.5 PG (ref 26.6–33)
MCHC RBC AUTO-ENTMCNC: 34.1 G/DL (ref 31.5–35.7)
MCV RBC AUTO: 107.1 FL (ref 79–97)
MONOCYTES # BLD AUTO: 0.48 10*3/MM3 (ref 0.1–0.9)
MONOCYTES NFR BLD AUTO: 5.3 % (ref 5–12)
NEUTROPHILS NFR BLD AUTO: 7.47 10*3/MM3 (ref 1.7–7)
NEUTROPHILS NFR BLD AUTO: 82.9 % (ref 42.7–76)
PLATELET # BLD AUTO: 84 10*3/MM3 (ref 140–450)
PMV BLD AUTO: 12.3 FL (ref 6–12)
POTASSIUM SERPL-SCNC: 3.5 MMOL/L (ref 3.5–5.2)
PROT SERPL-MCNC: 5.2 G/DL (ref 6–8.5)
RBC # BLD AUTO: 3.67 10*6/MM3 (ref 3.77–5.28)
SODIUM SERPL-SCNC: 141 MMOL/L (ref 136–145)
WBC # BLD AUTO: 9.02 10*3/MM3 (ref 3.4–10.8)

## 2021-09-02 PROCEDURE — 25010000002 SODIUM CHLORIDE 0.9 % WITH KCL 20 MEQ 20-0.9 MEQ/L-% SOLUTION: Performed by: INTERNAL MEDICINE

## 2021-09-02 PROCEDURE — 80053 COMPREHEN METABOLIC PANEL: CPT | Performed by: FAMILY MEDICINE

## 2021-09-02 PROCEDURE — 25010000002 PROCHLORPERAZINE 10 MG/2ML SOLUTION: Performed by: INTERNAL MEDICINE

## 2021-09-02 PROCEDURE — 85025 COMPLETE CBC W/AUTO DIFF WBC: CPT | Performed by: FAMILY MEDICINE

## 2021-09-02 PROCEDURE — 25010000002 ONDANSETRON PER 1 MG: Performed by: INTERNAL MEDICINE

## 2021-09-02 PROCEDURE — 83690 ASSAY OF LIPASE: CPT | Performed by: FAMILY MEDICINE

## 2021-09-02 RX ADMIN — POTASSIUM CHLORIDE AND SODIUM CHLORIDE 100 ML/HR: 900; 150 INJECTION, SOLUTION INTRAVENOUS at 20:28

## 2021-09-02 RX ADMIN — SODIUM CHLORIDE, PRESERVATIVE FREE 10 ML: 5 INJECTION INTRAVENOUS at 20:29

## 2021-09-02 RX ADMIN — FAMOTIDINE 20 MG: 10 INJECTION INTRAVENOUS at 09:17

## 2021-09-02 RX ADMIN — FAMOTIDINE 20 MG: 10 INJECTION INTRAVENOUS at 20:29

## 2021-09-02 RX ADMIN — PROCHLORPERAZINE EDISYLATE 2.5 MG: 5 INJECTION, SOLUTION INTRAMUSCULAR; INTRAVENOUS at 03:22

## 2021-09-02 RX ADMIN — POTASSIUM CHLORIDE AND SODIUM CHLORIDE 100 ML/HR: 900; 150 INJECTION, SOLUTION INTRAVENOUS at 10:38

## 2021-09-02 RX ADMIN — SODIUM CHLORIDE, PRESERVATIVE FREE 10 ML: 5 INJECTION INTRAVENOUS at 09:17

## 2021-09-02 RX ADMIN — ONDANSETRON 4 MG: 2 INJECTION INTRAMUSCULAR; INTRAVENOUS at 13:26

## 2021-09-02 RX ADMIN — ONDANSETRON 4 MG: 2 INJECTION INTRAMUSCULAR; INTRAVENOUS at 20:29

## 2021-09-02 RX ADMIN — PROCHLORPERAZINE EDISYLATE 2.5 MG: 5 INJECTION, SOLUTION INTRAMUSCULAR; INTRAVENOUS at 17:07

## 2021-09-02 NOTE — PLAN OF CARE
Goal Outcome Evaluation:  Plan of Care Reviewed With: patient        Progress: improving   New transfer to room 444. VSS. C/O pain in abdomen and back, controlled by continuous pain medication administration. Sinus/sinus tach  on tele. Continues on room air with O2 saturation in the 90's. CIWA protocol remains a 1. Continues on IVF. Patient has been NPO throughout the shift. Medication education provided. Safety maintained.

## 2021-09-02 NOTE — PROGRESS NOTES
[facility]  INPATIENT PROGRESS NOTE    Length of Stay: 2  Date of Admission: 8/31/2021  Primary Care Physician: Provider, No Known    Subjective     Chief Complaint:     Abdominal discomfort, incidentally positive Covid screen    HPI     The patient's abdominal discomfort is significantly improved.  She remains awake, alert and conversational.  White blood cell count is within normal limits today.  ALT and AST have decreased to 49 and 69 respectively.  Total bilirubin has improved to 1.6.  MRCP shows extensive pancreatitis with phlegmonous fluid and edema surrounding the pancreatic gland within the anterior pararenal space.  There is also reactive ascites with fluid in the perihepatic and perisplenic space as well as the upper paracolic gutters.  There is no evidence of pancreatic necrosis or pancreatic mass.  There is no evidence of intra or extrahepatic biliary dilatation.  Severe steatosis of the liver was noted.  The patient continues to have oxygen saturations varying from  on room air.  She has no cough and is asymptomatic from a Covid standpoint.  Lipase has significantly decreased to 258.  At this point I believe we can advance her to a clear liquid diet.      Review of Systems     All pertinent negatives and positives are as above. All other systems have been reviewed and are negative unless otherwise stated.     Objective    Temp:  [97.2 °F (36.2 °C)-99 °F (37.2 °C)] 99 °F (37.2 °C)  Heart Rate:  [] 106  Resp:  [16-18] 18  BP: (128-141)/(78-99) 138/86    Lab Results (last 24 hours)     Procedure Component Value Units Date/Time    CBC & Differential [[id number]]  (Abnormal) Collected: 09/02/21 0456    Specimen: Blood Updated: 09/02/21 0641    Narrative:      The following orders were created for panel order CBC & Differential.  Procedure                               Abnormality         Status                     ---------                                -----------         ------                     CBC Auto Differential[[id number]]        Abnormal            Final result                 Please view results for these tests on the individual orders.    CBC Auto Differential [[id number]]  (Abnormal) Collected: 09/02/21 0456    Specimen: Blood Updated: 09/02/21 0641     WBC 9.02 10*3/mm3      Comment: Modified report. Previous result was 8.91 10*3/mm3 on 9/2/2021 at [facility] CDT.        RBC 3.67 10*6/mm3      Comment: Modified report. Previous result was 3.69 10*6/mm3 on 9/2/2021 at [facility] CDT.        Hemoglobin 13.4 g/dL      Comment: Modified report. Previous result was 13.5 g/dL on 9/2/2021 at [facility] CDT.        Hematocrit 39.3 %      Comment: Modified report. Previous result was 39.8 % on 9/2/2021 at [facility] CDT.        .1 fL      Comment: Modified report. Previous result was 107.9 fL on 9/2/2021 at [address] [place], [place] [address]T.        MCH 36.5 pg      Comment: Modified report. Previous result was 36.6 pg on 9/2/2021 at [facility] CDT.        MCHC 34.1 g/dL      Comment: Modified report. Previous result was 33.9 g/dL on 9/2/2021 at [facility] CDT.        RDW 14.9 %      Comment: Modified report. Previous result was 14.8 % on 9/2/2021 at [facility] CDT.        RDW-SD 57.2 fl      Comment: Modified report. Previous result was 56.8 fl on 9/2/2021 at [facility] CDT.        MPV 12.3 fL      Platelets 84 10*3/mm3      Comment: Modified report. Previous result was 74 10*3/mm3 on 9/2/2021 at [facility] CDT.        Neutrophil % 82.9 %      Lymphocyte % 11.3 %      Monocyte % 5.3 %      Eosinophil % 0.2 %      Basophil % 0.1 %      Neutrophils, Absolute 7.47 10*3/mm3      Lymphocytes, Absolute 1.02 10*3/mm3      Monocytes, Absolute 0.48 10*3/mm3      Eosinophils, Absolute 0.02 10*3/mm3      Basophils, Absolute 0.01 10*3/mm3     Narrative:      MCV verified by heating 15 min at 37 degrees  Appended report. These results have been appended to a previously verified report.    Comprehensive Metabolic Panel [[id number]]   (Abnormal) Collected: 09/02/21 0456    Specimen: Blood Updated: 09/02/21 0617     Glucose 89 mg/dL      BUN 4 mg/dL      Creatinine 0.35 mg/dL      Sodium 141 mmol/L      Potassium 3.5 mmol/L      Chloride 103 mmol/L      CO2 26.0 mmol/L      Calcium 8.4 mg/dL      Total Protein 5.2 g/dL      Albumin 3.00 g/dL      ALT (SGPT) 49 U/L      AST (SGOT) 69 U/L      Alkaline Phosphatase 78 U/L      Total Bilirubin 1.6 mg/dL      eGFR Non African Amer >150 mL/min/1.73      Globulin 2.2 gm/dL      A/G Ratio 1.4 g/dL      BUN/Creatinine Ratio 11.4     Anion Gap 12.0 mmol/L     Narrative:      GFR Normal >60  Chronic Kidney Disease <60  Kidney Failure <15      Lipase [[id number]]  (Abnormal) Collected: 09/02/21 0456    Specimen: Blood Updated: 09/02/21 0617     Lipase 258 U/L           Imaging Results (Last 24 Hours)     ** No results found for the last 24 hours. **             Intake/Output Summary (Last 24 hours) at 9/2/2021 1536  Last data filed at 9/2/2021 0600  Gross per 24 hour   Intake 1064.03 ml   Output 700 ml   Net 364.03 ml       Physical Exam  Constitutional:       General: She is not in acute distress.     Appearance: Normal appearance.   HENT:      Head: Normocephalic and atraumatic.      Right Ear: External ear normal.      Left Ear: External ear normal.      Nose: Nose normal.      Mouth: Mucous membranes are moist.      Pharynx: Oropharynx is clear.   Eyes:      General: No scleral icterus.     Conjunctiva/sclera: Conjunctivae normal.   Cardiovascular:      Rate and Rhythm: Normal rate and regular rhythm.      Pulses: Normal pulses.      Heart sounds: Normal heart sounds.   Pulmonary:      Effort: Pulmonary effort is normal.      Breath sounds: Normal breath sounds.   Abdominal:      General: Abdomen is flat.      Palpations: Abdomen is soft.      Tenderness: There is abdominal tenderness in the epigastric area.   Musculoskeletal:         General: Normal range of motion.      Right lower leg: No edema.       Left lower leg: No edema.   Skin:     General: Skin is warm and dry.   Neurological:      General: No focal deficit present.      Mental Status: She is alert and oriented to person, place, and time. Mental status is at baseline.   Psychiatric:         Mood and Affect: Mood normal.         Judgment: Judgment normal.     Results Review:  I have reviewed the labs, radiology results, and diagnostic studies since my last progress note and made treatment changes reflective of the results.   I have reviewed the current medications.    Assessment/Plan     Active Hospital Problems    Diagnosis    • **Alcohol-induced acute pancreatitis without infection or necrosis    • COVID-19 virus detected    • Hyperbilirubinemia    • Abnormal transaminases        PLAN:  Advance diet to clear liquid diet  Repeat lipase and CMP in a.m.  Increased activity in the room encouraged    Electronically signed by Jm Garza DO, 09/02/21, 15:36 CDT.

## 2021-09-02 NOTE — PLAN OF CARE
Goal Outcome Evaluation:           Progress: improving   VSS. S/ST  on tele. C/o pain in abdomen and back. Intermittent nausea. Pt on room air. Safety maintained.

## 2021-09-03 LAB
ALBUMIN SERPL-MCNC: 2.8 G/DL (ref 3.5–5.2)
ALBUMIN/GLOB SERPL: 1.2 G/DL
ALP SERPL-CCNC: 89 U/L (ref 39–117)
ALT SERPL W P-5'-P-CCNC: 39 U/L (ref 1–33)
ANION GAP SERPL CALCULATED.3IONS-SCNC: 11 MMOL/L (ref 5–15)
AST SERPL-CCNC: 55 U/L (ref 1–32)
BILIRUB SERPL-MCNC: 1.1 MG/DL (ref 0–1.2)
BUN SERPL-MCNC: 3 MG/DL (ref 6–20)
BUN/CREAT SERPL: 10 (ref 7–25)
CALCIUM SPEC-SCNC: 8.3 MG/DL (ref 8.6–10.5)
CHLORIDE SERPL-SCNC: 96 MMOL/L (ref 98–107)
CO2 SERPL-SCNC: 25 MMOL/L (ref 22–29)
CREAT SERPL-MCNC: 0.3 MG/DL (ref 0.57–1)
GFR SERPL CREATININE-BSD FRML MDRD: >150 ML/MIN/1.73
GLOBULIN UR ELPH-MCNC: 2.4 GM/DL
GLUCOSE SERPL-MCNC: 80 MG/DL (ref 65–99)
LIPASE SERPL-CCNC: 81 U/L (ref 13–60)
POTASSIUM SERPL-SCNC: 3.9 MMOL/L (ref 3.5–5.2)
PROT SERPL-MCNC: 5.2 G/DL (ref 6–8.5)
SODIUM SERPL-SCNC: 132 MMOL/L (ref 136–145)

## 2021-09-03 PROCEDURE — 25010000002 ONDANSETRON PER 1 MG: Performed by: INTERNAL MEDICINE

## 2021-09-03 PROCEDURE — 25010000002 PROCHLORPERAZINE 10 MG/2ML SOLUTION: Performed by: INTERNAL MEDICINE

## 2021-09-03 PROCEDURE — 80053 COMPREHEN METABOLIC PANEL: CPT | Performed by: FAMILY MEDICINE

## 2021-09-03 PROCEDURE — 83690 ASSAY OF LIPASE: CPT | Performed by: FAMILY MEDICINE

## 2021-09-03 RX ORDER — HYDROCODONE BITARTRATE AND ACETAMINOPHEN 7.5; 325 MG/1; MG/1
1 TABLET ORAL EVERY 6 HOURS PRN
Status: DISCONTINUED | OUTPATIENT
Start: 2021-09-03 | End: 2021-09-04 | Stop reason: HOSPADM

## 2021-09-03 RX ADMIN — FAMOTIDINE 20 MG: 10 INJECTION INTRAVENOUS at 20:44

## 2021-09-03 RX ADMIN — HYDROCODONE BITARTRATE AND ACETAMINOPHEN 1 TABLET: 7.5; 325 TABLET ORAL at 11:42

## 2021-09-03 RX ADMIN — SODIUM CHLORIDE, PRESERVATIVE FREE 10 ML: 5 INJECTION INTRAVENOUS at 08:42

## 2021-09-03 RX ADMIN — HYDROCODONE BITARTRATE AND ACETAMINOPHEN 1 TABLET: 7.5; 325 TABLET ORAL at 17:55

## 2021-09-03 RX ADMIN — ONDANSETRON 4 MG: 2 INJECTION INTRAMUSCULAR; INTRAVENOUS at 08:38

## 2021-09-03 RX ADMIN — SODIUM CHLORIDE, PRESERVATIVE FREE 10 ML: 5 INJECTION INTRAVENOUS at 20:45

## 2021-09-03 RX ADMIN — PROCHLORPERAZINE EDISYLATE 2.5 MG: 5 INJECTION, SOLUTION INTRAMUSCULAR; INTRAVENOUS at 00:54

## 2021-09-03 RX ADMIN — PROCHLORPERAZINE EDISYLATE 2.5 MG: 5 INJECTION, SOLUTION INTRAMUSCULAR; INTRAVENOUS at 11:41

## 2021-09-03 RX ADMIN — FAMOTIDINE 20 MG: 10 INJECTION INTRAVENOUS at 08:53

## 2021-09-03 RX ADMIN — ONDANSETRON 4 MG: 2 INJECTION INTRAMUSCULAR; INTRAVENOUS at 15:21

## 2021-09-03 RX ADMIN — PROCHLORPERAZINE EDISYLATE 2.5 MG: 5 INJECTION, SOLUTION INTRAMUSCULAR; INTRAVENOUS at 20:44

## 2021-09-03 NOTE — PLAN OF CARE
Goal Outcome Evaluation:  Plan of Care Reviewed With: patient        Progress: no change  Outcome Summary: pt denies pain this shift. VSS. a/at . pt complained of nausea. medication given. pt rested well this shift. room air. covid precautions. no falls. safety maintained.

## 2021-09-03 NOTE — PLAN OF CARE
Goal Outcome Evaluation: Progress: improving. Outcome Summary: NTN follow up completed. Adding Boost Breeze twice daily. Continue to monitor per protocol.

## 2021-09-03 NOTE — PROGRESS NOTES
Discussed Shwetha Tobias with Kae, patient nurse for today  No complaints, no signs of GI blood loss.  Shwetha Tobias tolerated supper without difficulty    Lipase has trended down, as well as LFT    Follow up with GI post discharge

## 2021-09-03 NOTE — PLAN OF CARE
Goal Outcome Evaluation:  Plan of Care Reviewed With: patient        Progress: improving   VSS. S 72-91. C/o pain in abdomen and back. PCA pump discontinued. Norco 7.5 ordered PRN for pain. Pt on room air. Clear liquid diet resumed. Safety maintained.

## 2021-09-03 NOTE — PROGRESS NOTES
[facility] Medicine Services  INPATIENT PROGRESS NOTE    Length of Stay: 3  Date of Admission: 8/31/2021  Primary Care Physician: Provider, No Known    Subjective     Chief Complaint:     Abdominal discomfort    HPI     The patient's abdominal discomfort continues to improve.  Lipase has decreased to 81.  I advanced her to have a low-fat diet.  She seems to have tolerated it well initially but now is having some nausea afterward.  I will go back to clear liquid diet for this evening and will recheck lipase in a.m.  I may try to advance her diet again tomorrow if she does well with clears overnight.  I discussed the patient's condition with her  as he had requested a return call.      Review of Systems     All pertinent negatives and positives are as above. All other systems have been reviewed and are negative unless otherwise stated.     Objective    Temp:  [98.2 °F (36.8 °C)-99.5 °F (37.5 °C)] 98.8 °F (37.1 °C)  Heart Rate:  [] 87  Resp:  [18] 18  BP: (118-133)/(70-90) 130/84    Lab Results (last 24 hours)     Procedure Component Value Units Date/Time    Blood Culture - Blood, Arm, Left [[id number]] Collected: 08/31/21 1703    Specimen: Blood from Arm, Left Updated: 09/03/21 1730     Blood Culture No growth at 3 days    Blood Culture - Blood, Arm, Right [[id number]] Collected: 08/31/21 1704    Specimen: Blood from Arm, Right Updated: 09/03/21 1730     Blood Culture No growth at 3 days    Comprehensive Metabolic Panel [[id number]]  (Abnormal) Collected: 09/03/21 0455    Specimen: Blood Updated: 09/03/21 0537     Glucose 80 mg/dL      BUN 3 mg/dL      Creatinine 0.30 mg/dL      Sodium 132 mmol/L      Potassium 3.9 mmol/L      Chloride 96 mmol/L      CO2 25.0 mmol/L      Calcium 8.3 mg/dL      Total Protein 5.2 g/dL      Albumin 2.80 g/dL      ALT (SGPT) 39 U/L      AST (SGOT) 55 U/L      Alkaline Phosphatase 89 U/L      Total Bilirubin 1.1 mg/dL      eGFR Non   Amer >150 mL/min/1.73      Globulin 2.4 gm/dL      A/G Ratio 1.2 g/dL      BUN/Creatinine Ratio 10.0     Anion Gap 11.0 mmol/L     Narrative:      GFR Normal >60  Chronic Kidney Disease <60  Kidney Failure <15      Lipase [[id number]]  (Abnormal) Collected: 09/03/21 0455    Specimen: Blood Updated: 09/03/21 0537     Lipase 81 U/L           Imaging Results (Last 24 Hours)     ** No results found for the last 24 hours. **             Intake/Output Summary (Last 24 hours) at 9/3/2021 1754  Last data filed at 9/3/2021 1031  Gross per 24 hour   Intake 960 ml   Output --   Net 960 ml       Physical Exam  Constitutional:       General: She is not in acute distress.     Appearance: Normal appearance.   HENT:      Head: Normocephalic and atraumatic.      Right Ear: External ear normal.      Left Ear: External ear normal.      Mouth: Mucous membranes are moist.   Eyes:      General: No scleral icterus.     Conjunctiva/sclera: Conjunctivae normal.   Cardiovascular:      Rate and Rhythm: Normal rate and regular rhythm.      Pulses: Normal pulses.      Heart sounds: Normal heart sounds.   Pulmonary:      Effort: Pulmonary effort is normal.      Breath sounds: Normal breath sounds.   Abdominal:      General: Abdomen is flat.      Palpations: Abdomen is soft.      Tenderness: There is abdominal tenderness in the epigastric area.   Musculoskeletal:         General: Normal range of motion.      Right lower leg: No edema.      Left lower leg: No edema.   Skin:     General: Skin is warm and dry.   Neurological:      General: No focal deficit present.      Mental Status: She is alert and oriented to person, place, and time. Mental status is at baseline.   Psychiatric:         Mood and Affect: Mood normal.         Judgment: Judgment normal.     Results Review:  I have reviewed the labs, radiology results, and diagnostic studies since my last progress note and made treatment changes reflective of the results.   I have reviewed the current  medications.    Assessment/Plan     Active Hospital Problems    Diagnosis    • **Alcohol-induced acute pancreatitis without infection or necrosis    • COVID-19 virus detected    • Hyperbilirubinemia    • Abnormal transaminases        PLAN:  Return to clear liquid diet as low-fat diet caused nausea postprandially  Discontinue PCA pump  Norco 7.5mg 1 p.o. every 6 hours as needed pain  Continue IV narcotics as needed    Electronically signed by Jm Garza DO, 09/03/21, 17:54 CDT.

## 2021-09-04 ENCOUNTER — READMISSION MANAGEMENT (OUTPATIENT)
Dept: CALL CENTER | Facility: HOSPITAL | Age: 31
End: 2021-09-04

## 2021-09-04 VITALS
SYSTOLIC BLOOD PRESSURE: 128 MMHG | OXYGEN SATURATION: 96 % | RESPIRATION RATE: 18 BRPM | TEMPERATURE: 98.2 F | HEART RATE: 75 BPM | HEIGHT: 67 IN | DIASTOLIC BLOOD PRESSURE: 86 MMHG | WEIGHT: 161.2 LBS | BODY MASS INDEX: 25.3 KG/M2

## 2021-09-04 LAB
ALBUMIN SERPL-MCNC: 2.7 G/DL (ref 3.5–5.2)
ALBUMIN/GLOB SERPL: 1 G/DL
ALP SERPL-CCNC: 106 U/L (ref 39–117)
ALT SERPL W P-5'-P-CCNC: 35 U/L (ref 1–33)
ANION GAP SERPL CALCULATED.3IONS-SCNC: 12 MMOL/L (ref 5–15)
AST SERPL-CCNC: 58 U/L (ref 1–32)
BILIRUB SERPL-MCNC: 0.8 MG/DL (ref 0–1.2)
BUN SERPL-MCNC: 3 MG/DL (ref 6–20)
BUN/CREAT SERPL: 9.7 (ref 7–25)
CALCIUM SPEC-SCNC: 8.4 MG/DL (ref 8.6–10.5)
CHLORIDE SERPL-SCNC: 98 MMOL/L (ref 98–107)
CO2 SERPL-SCNC: 26 MMOL/L (ref 22–29)
CREAT SERPL-MCNC: 0.31 MG/DL (ref 0.57–1)
GFR SERPL CREATININE-BSD FRML MDRD: >150 ML/MIN/1.73
GLOBULIN UR ELPH-MCNC: 2.7 GM/DL
GLUCOSE SERPL-MCNC: 86 MG/DL (ref 65–99)
LIPASE SERPL-CCNC: 69 U/L (ref 13–60)
POTASSIUM SERPL-SCNC: 3.5 MMOL/L (ref 3.5–5.2)
PROT SERPL-MCNC: 5.4 G/DL (ref 6–8.5)
SODIUM SERPL-SCNC: 136 MMOL/L (ref 136–145)

## 2021-09-04 PROCEDURE — 25010000002 ONDANSETRON PER 1 MG: Performed by: INTERNAL MEDICINE

## 2021-09-04 PROCEDURE — 25010000002 SODIUM CHLORIDE 0.9 % WITH KCL 20 MEQ 20-0.9 MEQ/L-% SOLUTION: Performed by: FAMILY MEDICINE

## 2021-09-04 PROCEDURE — 83690 ASSAY OF LIPASE: CPT | Performed by: FAMILY MEDICINE

## 2021-09-04 PROCEDURE — 80053 COMPREHEN METABOLIC PANEL: CPT | Performed by: FAMILY MEDICINE

## 2021-09-04 PROCEDURE — 25010000002 PROCHLORPERAZINE 10 MG/2ML SOLUTION: Performed by: INTERNAL MEDICINE

## 2021-09-04 RX ORDER — HYDROCODONE BITARTRATE AND ACETAMINOPHEN 7.5; 325 MG/1; MG/1
1 TABLET ORAL EVERY 6 HOURS PRN
Qty: 10 TABLET | Refills: 0 | Status: SHIPPED | OUTPATIENT
Start: 2021-09-04 | End: 2021-09-10

## 2021-09-04 RX ADMIN — HYDROCODONE BITARTRATE AND ACETAMINOPHEN 1 TABLET: 7.5; 325 TABLET ORAL at 09:18

## 2021-09-04 RX ADMIN — POTASSIUM CHLORIDE AND SODIUM CHLORIDE 50 ML/HR: 900; 150 INJECTION, SOLUTION INTRAVENOUS at 00:35

## 2021-09-04 RX ADMIN — HYDROCODONE BITARTRATE AND ACETAMINOPHEN 1 TABLET: 7.5; 325 TABLET ORAL at 00:27

## 2021-09-04 RX ADMIN — PROCHLORPERAZINE EDISYLATE 2.5 MG: 5 INJECTION, SOLUTION INTRAMUSCULAR; INTRAVENOUS at 15:06

## 2021-09-04 RX ADMIN — SODIUM CHLORIDE, PRESERVATIVE FREE 10 ML: 5 INJECTION INTRAVENOUS at 09:18

## 2021-09-04 RX ADMIN — ONDANSETRON 4 MG: 2 INJECTION INTRAMUSCULAR; INTRAVENOUS at 00:27

## 2021-09-04 RX ADMIN — PROCHLORPERAZINE EDISYLATE 2.5 MG: 5 INJECTION, SOLUTION INTRAMUSCULAR; INTRAVENOUS at 09:17

## 2021-09-04 RX ADMIN — FAMOTIDINE 20 MG: 10 INJECTION INTRAVENOUS at 09:17

## 2021-09-04 NOTE — DISCHARGE SUMMARY
[facility] Medicine Services  DISCHARGE SUMMARY       Date of Admission: 8/31/2021  Date of Discharge:  9/4/2021  Primary Care Physician: Provider, No Known    Discharge Diagnoses:  Active Hospital Problems    Diagnosis    • **Alcohol-induced acute pancreatitis without infection or necrosis    • COVID-19 virus detected    • Hyperbilirubinemia    • Abnormal transaminases          Presenting Problem/History of Present Illness:  Alcohol-induced acute pancreatitis without infection or necrosis [K85.20]     Chief Complaint on Day of Discharge:   No complaint    History of Present Illness on Day of Discharge:   The patient is doing very well today. She tolerated a low-fat diet earlier today. I took her supper into her and she appears hungry. She is stable with an improved and almost normalized lipase. Since she is tolerating a fat-free diet, she is stable for discharge home today. She is completely asymptomatic from a COVID-19 standpoint. She is on room air with no evidence of dyspnea with exertion. Bilirubin is almost normal and transaminases are trending downward sharply.    Hospital Course  30-year-old female who presents to the emergency department abdominal pain.  She describes the pain as a boring type of pain that starts in the front and goes to the back.  The patient initially thought that she was recovering from Salmonella, as her whole and family had a Salmonella infection.  She states that her condition worsened over the last 2 days.  She has had intractable nausea and vomiting, with diarrhea.  Patient states that she does drink alcohol.  She states that she had stopped for a couple weeks, but had some family issues and started again.  She states she does not feel like she will go through alcohol withdrawal.  She is had no blood in her emesis.  She is had no chest pain.  She has had no acute breathing issues.  Plan:   1.  Pain control  2.  Telemetry  3.  GI consult  4.   N.p.o.  5.  Pepcid  6.  Labs in the morning  7.  Levaquin  8.  O2 support if needed  9.  Nausea control  10.  IV fluids with potassium    The patient did not have any evidence of alcohol withdrawal throughout her hospital stay. Drug screen was positive for THC, opiates and benzodiazepines. She continued to receive the Ativan and was placed on Dilaudid via PCA. Scheduled Ativan was discontinued on the day after admission and she remained on the as needed CIWA protocol. An MRCP was ordered to further assess for possible pancreatic necrosis given the severity of her symptoms and the degree of lipase elevation at the time of admission. MRCP revealed no evidence of ductal obstruction. There was evidence of phlegmon development consistent with severe pancreatitis. Gastroenterology saw and evaluated the patient and concurred with ongoing treatment. She was advanced to a clear liquid diet on 9/2 and tolerated that well. On the day following that, 9/3, she was advanced to a low-fat diet and did have some recurrence of nausea but no abdominal discomfort. We backed that down to a clear liquid diet which she continued to tolerate. She was advanced to low-fat diet again on 9/4 and tolerated it well. She is advised to have a follow-up abdominal scan including her pancreas on an outpatient basis to be scheduled by her primary care physician to assess for potential pseudocyst formation. Gastroenterology continue to follow her throughout her hospital stay. She is stable for discharge today with lipase trending downward sharply and normalizing LFTs and bilirubin.    Consults:   Gastroenterology:  Assessment  -probable alcoholic pancreatitis with local complications (moderate to severe).  Imaging suggests severe disease, as does SIRS score.  Smoking increases the risk of relapse more than continued ethanol use, but obviously both should be avoided.  PRSS-1 hereditary pancreatitis usually presents in the third decade of life, so this  would be a possibility, but with her history, ethanol is the more likely etiology.  CPA1 and CLDN2 mutations are more common in men with alcoholic pancreatitis.  -Yanique's criteria +2, SIRS (+)  -acute alcoholic hepatitis.  Hyperbilirubinemia may persist for weeks or months, and is not an indicator of clinical course.  ·             Maddrey's DF = 10.5, unlikely to benefit from steroids       ·             Lille score = pending day-seven labs  ·             MELD = 10 (6% three-month mortality)  -liver function is modestly impaired, as is expected in alcoholic hepatitis.  Patient does not meet criteria for acute liver failure.    -COVID           Recommendations  -supportive care: pain management and vigorous IV hydration, careful attention to electrolytes, etc.  -multivitamins and especially antioxidant vitamins have (limited) clinical data suggesting improved clinical outcomes in the long term (>10 years)  -complete abstinence from drinking ethanol and smoking tobacco  -consider referral for genetic testing post convalescence  -regular (annual?) screening for pancreatic cancer should be considered  -would also consider repeat pancreatic imaging in three months to assess for small masses which might have been obscured due to active inflammatory process  -would recommend PCA pump/narcotic analgesia as needed for pain control (per UpToDate recommendations)  -consider obtaining MEGHNA and IgG4 as screen for autoimmune pancreatitis (very rare in my experience)  -acid suppression: IV PPi  -avoid agents toxic to GI tract mucosa  -NPO with ice chips and sips of water until pain abates.  Early re-feeding has proponents, however, the supportive data is not overwhelming.  -once pain subsides, clear liquids, than advance diet slowly as tolerated to low fat diet  -no indication for gastrointestinal endoscopy at this time  -encourage AA attendance  -serum lipid panel to include triglyceride level  -DT precautions  -supplement  multivitamins, particularly thiamine  -post convalescence, at [facility] [place], recheck CMP, consider MEGHNA, AMA, ASmA, alpha-1 antitrypsin, serum copper, ceruloplasmin, iron, TIBC, iron sat, ferritin, AFP  -follow-up office visit in GI clinic office in four weeks           Thank you for allowing us to participate in the care of this patient.     Sincerely,     SAIDA De Leon MD, FACP    Pertinent Test Results:   Lab Results (last 7 days)     Procedure Component Value Units Date/Time    Blood Culture - Blood, Arm, Left [[id number]] Collected: 08/31/21 1703    Specimen: Blood from Arm, Left Updated: 09/04/21 1732     Blood Culture No growth at 4 days    Blood Culture - Blood, Arm, Right [[id number]] Collected: 08/31/21 1704    Specimen: Blood from Arm, Right Updated: 09/04/21 1732     Blood Culture No growth at 4 days    Comprehensive Metabolic Panel [[id number]]  (Abnormal) Collected: 09/04/21 0322    Specimen: Blood Updated: 09/04/21 0421     Glucose 86 mg/dL      BUN 3 mg/dL      Creatinine 0.31 mg/dL      Sodium 136 mmol/L      Potassium 3.5 mmol/L      Chloride 98 mmol/L      CO2 26.0 mmol/L      Calcium 8.4 mg/dL      Total Protein 5.4 g/dL      Albumin 2.70 g/dL      ALT (SGPT) 35 U/L      AST (SGOT) 58 U/L      Alkaline Phosphatase 106 U/L      Total Bilirubin 0.8 mg/dL      eGFR Non African Amer >150 mL/min/1.73      Globulin 2.7 gm/dL      A/G Ratio 1.0 g/dL      BUN/Creatinine Ratio 9.7     Anion Gap 12.0 mmol/L     Narrative:      GFR Normal >60  Chronic Kidney Disease <60  Kidney Failure <15      Lipase [[id number]]  (Abnormal) Collected: 09/04/21 0322    Specimen: Blood Updated: 09/04/21 0421     Lipase 69 U/L     Comprehensive Metabolic Panel [[id number]]  (Abnormal) Collected: 09/03/21 0455    Specimen: Blood Updated: 09/03/21 0537     Glucose 80 mg/dL      BUN 3 mg/dL      Creatinine 0.30 mg/dL      Sodium 132 mmol/L      Potassium 3.9 mmol/L      Chloride 96 mmol/L      CO2 25.0 mmol/L      Calcium 8.3 mg/dL      Total  Protein 5.2 g/dL      Albumin 2.80 g/dL      ALT (SGPT) 39 U/L      AST (SGOT) 55 U/L      Alkaline Phosphatase 89 U/L      Total Bilirubin 1.1 mg/dL      eGFR Non African Amer >150 mL/min/1.73      Globulin 2.4 gm/dL      A/G Ratio 1.2 g/dL      BUN/Creatinine Ratio 10.0     Anion Gap 11.0 mmol/L     Narrative:      GFR Normal >60  Chronic Kidney Disease <60  Kidney Failure <15      Lipase [[id number]]  (Abnormal) Collected: 09/03/21 0455    Specimen: Blood Updated: 09/03/21 0537     Lipase 81 U/L     CBC & Differential [[id number]]  (Abnormal) Collected: 09/02/21 0456    Specimen: Blood Updated: 09/02/21 0641    Narrative:      The following orders were created for panel order CBC & Differential.  Procedure                               Abnormality         Status                     ---------                               -----------         ------                     CBC Auto Differential[[id number]]        Abnormal            Final result                 Please view results for these tests on the individual orders.    CBC Auto Differential [[id number]]  (Abnormal) Collected: 09/02/21 0456    Specimen: Blood Updated: 09/02/21 0641     WBC 9.02 10*3/mm3      Comment: Modified report. Previous result was 8.91 10*3/mm3 on 9/2/2021 at [address] [place], [place] [address]T.        RBC 3.67 10*6/mm3      Comment: Modified report. Previous result was 3.69 10*6/mm3 on 9/2/2021 at [address] [place], [place] [address]T.        Hemoglobin 13.4 g/dL      Comment: Modified report. Previous result was 13.5 g/dL on 9/2/2021 at [address] [place], [place] [address]T.        Hematocrit 39.3 %      Comment: Modified report. Previous result was 39.8 % on 9/2/2021 at [address] [place], [place] [address]T.        .1 fL      Comment: Modified report. Previous result was 107.9 fL on 9/2/2021 at [address] [place], [place] [address]T.        MCH 36.5 pg      Comment: Modified report. Previous result was 36.6 pg on 9/2/2021 at [address] [place], [place] [address]T.        MCHC 34.1 g/dL      Comment: Modified report. Previous result was 33.9 g/dL on 9/2/2021 at 0631 CDT.        RDW 14.9 %      Comment:  Modified report. Previous result was 14.8 % on 9/2/2021 at 0631 CDT.        RDW-SD 57.2 fl      Comment: Modified report. Previous result was 56.8 fl on 9/2/2021 at 0631 CDT.        MPV 12.3 fL      Platelets 84 10*3/mm3      Comment: Modified report. Previous result was 74 10*3/mm3 on 9/2/2021 at 0631 CDT.        Neutrophil % 82.9 %      Lymphocyte % 11.3 %      Monocyte % 5.3 %      Eosinophil % 0.2 %      Basophil % 0.1 %      Neutrophils, Absolute 7.47 10*3/mm3      Lymphocytes, Absolute 1.02 10*3/mm3      Monocytes, Absolute 0.48 10*3/mm3      Eosinophils, Absolute 0.02 10*3/mm3      Basophils, Absolute 0.01 10*3/mm3     Narrative:      MCV verified by heating 15 min at 37 degrees  Appended report. These results have been appended to a previously verified report.    Comprehensive Metabolic Panel [[id number]]  (Abnormal) Collected: 09/02/21 0456    Specimen: Blood Updated: 09/02/21 0617     Glucose 89 mg/dL      BUN 4 mg/dL      Creatinine 0.35 mg/dL      Sodium 141 mmol/L      Potassium 3.5 mmol/L      Chloride 103 mmol/L      CO2 26.0 mmol/L      Calcium 8.4 mg/dL      Total Protein 5.2 g/dL      Albumin 3.00 g/dL      ALT (SGPT) 49 U/L      AST (SGOT) 69 U/L      Alkaline Phosphatase 78 U/L      Total Bilirubin 1.6 mg/dL      eGFR Non African Amer >150 mL/min/1.73      Globulin 2.2 gm/dL      A/G Ratio 1.4 g/dL      BUN/Creatinine Ratio 11.4     Anion Gap 12.0 mmol/L     Narrative:      GFR Normal >60  Chronic Kidney Disease <60  Kidney Failure <15      Lipase [[id number]]  (Abnormal) Collected: 09/02/21 0456    Specimen: Blood Updated: 09/02/21 0617     Lipase 258 U/L     Lipase [[id number]]  (Abnormal) Collected: 09/01/21 1450    Specimen: Blood Updated: 09/01/21 1521     Lipase 641 U/L     Lactic Acid, Plasma [[id number]]  (Normal) Collected: 09/01/21 0931    Specimen: Blood Updated: 09/01/21 1012     Lactate 1.2 mmol/L     Lipase [[id number]]  (Abnormal) Collected: 09/01/21 0620    Specimen: Blood Updated:  09/01/21 0735     Lipase 634 U/L     Comprehensive Metabolic Panel [[id number]]  (Abnormal) Collected: 09/01/21 0620    Specimen: Blood Updated: 09/01/21 0726     Glucose 123 mg/dL      BUN 6 mg/dL      Creatinine 0.47 mg/dL      Sodium 142 mmol/L      Potassium 3.8 mmol/L      Chloride 97 mmol/L      CO2 24.0 mmol/L      Calcium 8.4 mg/dL      Total Protein 5.9 g/dL      Albumin 3.50 g/dL      ALT (SGPT) 77 U/L      AST (SGOT) 112 U/L      Alkaline Phosphatase 94 U/L      Total Bilirubin 1.8 mg/dL      eGFR Non African Amer >150 mL/min/1.73      Globulin 2.4 gm/dL      A/G Ratio 1.5 g/dL      BUN/Creatinine Ratio 12.8     Anion Gap 21.0 mmol/L     Narrative:      GFR Normal >60  Chronic Kidney Disease <60  Kidney Failure <15      Phosphorus [[id number]]  (Normal) Collected: 09/01/21 0620    Specimen: Blood Updated: 09/01/21 0724     Phosphorus 2.9 mg/dL     Magnesium [[id number]]  (Normal) Collected: 09/01/21 0620    Specimen: Blood Updated: 09/01/21 0721     Magnesium 1.6 mg/dL     CBC Auto Differential [[id number]]  (Abnormal) Collected: 09/01/21 0620    Specimen: Blood Updated: 09/01/21 0710     WBC 12.28 10*3/mm3      RBC 4.53 10*6/mm3      Hemoglobin 16.5 g/dL      Hematocrit 47.0 %      .8 fL      MCH 36.4 pg      MCHC 35.1 g/dL      RDW 14.8 %      RDW-SD 55.1 fl      MPV 11.9 fL      Platelets 143 10*3/mm3      Neutrophil % 85.4 %      Lymphocyte % 7.2 %      Monocyte % 6.7 %      Eosinophil % 0.0 %      Basophil % 0.2 %      Neutrophils, Absolute 10.50 10*3/mm3      Lymphocytes, Absolute 0.88 10*3/mm3      Monocytes, Absolute 0.82 10*3/mm3      Eosinophils, Absolute 0.00 10*3/mm3      Basophils, Absolute 0.02 10*3/mm3     Narrative:      Results repeated to confirm    Urine Drug Screen - Urine, Clean Catch [[id number]]  (Abnormal) Collected: 08/31/21 5758    Specimen: Urine, Clean Catch Updated: 08/31/21 7553     THC, Screen, Urine Positive     Phencyclidine (PCP), Urine Negative     Cocaine Screen,  Urine Negative     Methamphetamine, Ur Negative     Opiate Screen Positive     Amphetamine Screen, Urine Negative     Benzodiazepine Screen, Urine Positive     Tricyclic Antidepressants Screen Negative     Methadone Screen, Urine Negative     Barbiturates Screen, Urine Negative     Oxycodone Screen, Urine Negative     Propoxyphene Screen Negative     Buprenorphine, Screen, Urine Negative    Narrative:      Cutoff For Drugs Screened:    Amphetamines               500 ng/ml  Barbiturates               200 ng/ml  Benzodiazepines            150 ng/ml  Cocaine                    150 ng/ml  Methadone                  200 ng/ml  Opiates                    100 ng/ml  Phencyclidine               25 ng/ml  THC                            50 ng/ml  Methamphetamine            500 ng/ml  Tricyclic Antidepressants  300 ng/ml  Oxycodone                  100 ng/ml  Propoxyphene               300 ng/ml  Buprenorphine               10 ng/ml    The normal value for all drugs tested is negative. This report includes unconfirmed screening results, with the cutoff values listed, to be used for medical treatment purposes only.  Unconfirmed results must not be used for non-medical purposes such as employment or legal testing.  Clinical consideration should be applied to any drug of abuse test, particularly when unconfirmed results are used.      Hepatitis Panel, Acute [[id number]]  (Normal) Collected: 08/31/21 2007    Specimen: Blood Updated: 08/31/21 2215     Hepatitis B Surface Ag Non-Reactive     Hep A IgM Non-Reactive     Hep B C IgM Non-Reactive     Hepatitis C Ab Non-Reactive    Narrative:      Results may be falsely decreased if patient taking Biotin.     STAT Lactic Acid, Reflex [[id number]]  (Abnormal) Collected: 08/31/21 2007    Specimen: Blood Updated: 08/31/21 2100     Lactate 5.5 mmol/L     Ethanol [[id number]] Collected: 08/31/21 2007    Specimen: Blood Updated: 08/31/21 2042     Ethanol % 0.099 %     Narrative:      Not for  "legal purposes. Chain of Custody not followed.     aPTT [[id number]]  (Normal) Collected: 08/31/21 2007    Specimen: Blood Updated: 08/31/21 2036     PTT 24.9 seconds     Protime-INR [[id number]]  (Abnormal) Collected: 08/31/21 2007    Specimen: Blood Updated: 08/31/21 2036     Protime 13.9 Seconds      INR 1.16    Procalcitonin [[id number]]  (Normal) Collected: 08/31/21 1606    Specimen: Blood Updated: 08/31/21 1943     Procalcitonin 0.03 ng/mL     Narrative:      As a Marker for Sepsis (Non-Neonates):     1. <0.5 ng/mL represents a low risk of severe sepsis and/or septic shock.  2. >2 ng/mL represents a high risk of severe sepsis and/or septic shock.    As a Marker for Lower Respiratory Tract Infections that require antibiotic therapy:  PCT on Admission     Antibiotic Therapy             6-12 Hrs later  >0.5                          Strongly Recommended            >0.25 - <0.5             Recommended  0.1 - 0.25                  Discouraged                       Remeasure/reassess PCT  <0.1                         Strongly Discouraged         Remeasure/reassess PCT      As 28 day mortality risk marker: \"Change in Procalcitonin Result\" (>80% or <=80%) if Day 0 (or Day 1) and Day 4 values are available. Refer to http://www.[employer].sgs-pct-calculator.com/    Change in PCT <=80 %   A decrease of PCT levels below or equal to 80% defines a positive change in PCT test result representing a higher risk for 28-day all-cause mortality of patients diagnosed with severe sepsis or septic shock.    Change in PCT >80 %   A decrease of PCT levels of more than 80% defines a negative change in PCT result representing a lower risk for 28-day all-cause mortality of patients diagnosed with severe sepsis or septic shock.                Ferritin [[id number]]  (Abnormal) Collected: 08/31/21 1606    Specimen: Blood Updated: 08/31/21 1941     Ferritin 1,048.00 ng/mL     Narrative:      Results may be falsely decreased if patient taking Biotin.      " Lactate Dehydrogenase [[id number]]  (Abnormal) Collected: 08/31/21 1606    Specimen: Blood Updated: 08/31/21 1935      U/L     COVID-19,[facility] IN-HOUSE,Nasal Swab No Transport Media 3-4 HR TAT - Swab, Nasal Cavity [[id number]]  (Abnormal) Collected: 08/31/21 1706    Specimen: Swab from Nasal Cavity Updated: 08/31/21 1829     COVID19 Detected    Narrative:      Fact sheet for providers: https://www.fda.gov/media/955314/download     Fact sheet for patients: https://www.fda.gov/media/602448/download    Test performed by PCR.    Consider negative results in combination with clinical observations, patient history, and epidemiological information.    Lactic Acid, Plasma [[id number]]  (Abnormal) Collected: 08/31/21 1703    Specimen: Blood Updated: 08/31/21 1742     Lactate 7.7 mmol/L     [facility] Draw [[id number]] Collected: 08/31/21 1606    Specimen: Blood Updated: 08/31/21 1715    Narrative:      The following orders were created for panel order [facility] Draw.  Procedure                               Abnormality         Status                     ---------                               -----------         ------                     Green Top (Gel)[[id number]]                                  Final result               Lavender Top[[id number]]                                     Final result               Red Top[[id number]]                                          Final result               Light Blue Top[[id number]]                                   Final result                 Please view results for these tests on the individual orders.    Lavender Top [[id number]] Collected: 08/31/21 1606    Specimen: Blood Updated: 08/31/21 1715     Extra Tube hold for add-on     Comment: Auto resulted       Green Top (Gel) [[id number]] Collected: 08/31/21 1606    Specimen: Blood Updated: 08/31/21 1715     Extra Tube Hold for add-ons.     Comment: Auto resulted.       Red Top [[id number]] Collected: 08/31/21 1606    Specimen: Blood  Updated: 08/31/21 1715     Extra Tube Hold for add-ons.     Comment: Auto resulted.       Light Blue Top [[id number]] Collected: 08/31/21 1606    Specimen: Blood Updated: 08/31/21 1715     Extra Tube hold for add-on     Comment: Auto resulted       Lipase [[id number]]  (Abnormal) Collected: 08/31/21 1606    Specimen: Blood Updated: 08/31/21 1710     Lipase 1,290 U/L     Comprehensive Metabolic Panel [[id number]]  (Abnormal) Collected: 08/31/21 1606    Specimen: Blood Updated: 08/31/21 1659     Glucose 107 mg/dL      BUN 8 mg/dL      Creatinine 0.50 mg/dL      Sodium 139 mmol/L      Potassium 3.4 mmol/L      Chloride 90 mmol/L      CO2 16.0 mmol/L      Calcium 9.4 mg/dL      Total Protein 7.5 g/dL      Albumin 4.40 g/dL      ALT (SGPT) 114 U/L      AST (SGOT) 253 U/L      Alkaline Phosphatase 130 U/L      Total Bilirubin 1.7 mg/dL      eGFR Non African Amer 146 mL/min/1.73      eGFR  African Amer >150 mL/min/1.73      Globulin 3.1 gm/dL      A/G Ratio 1.4 g/dL      BUN/Creatinine Ratio 16.0     Anion Gap 33.0 mmol/L     Narrative:      GFR Normal >60  Chronic Kidney Disease <60  Kidney Failure <15      Urinalysis, Microscopic Only - Urine, Clean Catch [[id number]]  (Abnormal) Collected: 08/31/21 1610    Specimen: Urine, Clean Catch Updated: 08/31/21 1657     RBC, UA 0-2 /HPF      WBC, UA 3-5 /HPF      Bacteria, UA 3+ /HPF      Squamous Epithelial Cells, UA 7-12 /HPF      Hyaline Casts, UA Too Numerous to Count /LPF      Methodology Manual Light Microscopy    Urinalysis With Microscopic If Indicated (No Culture) - Urine, Clean Catch [[id number]]  (Abnormal) Collected: 08/31/21 1610    Specimen: Urine, Clean Catch Updated: 08/31/21 1643     Color, UA [place]     Appearance, UA Cloudy     pH, UA <=5.0     Specific Gravity, UA >1.030     Glucose, UA Negative     Ketones, UA 15 mg/dL (1+)     Bilirubin, UA Moderate (2+)     Blood, UA Trace     Protein,  mg/dL (2+)     Leuk Esterase, UA Trace     Nitrite, UA Positive      Urobilinogen, UA 1.0 E.U./dL    Narrative:      Dipstick results may be inaccurate due to color interference.    CBC & Differential [[id number]]  (Abnormal) Collected: 08/31/21 1606    Specimen: Blood Updated: 08/31/21 1636    Narrative:      The following orders were created for panel order CBC & Differential.  Procedure                               Abnormality         Status                     ---------                               -----------         ------                     CBC Auto Differential[[id number]]        Abnormal            Final result                 Please view results for these tests on the individual orders.    CBC Auto Differential [[id number]]  (Abnormal) Collected: 08/31/21 1606    Specimen: Blood Updated: 08/31/21 1636     WBC 16.14 10*3/mm3      RBC 5.15 10*6/mm3      Hemoglobin 18.9 g/dL      Hematocrit 53.0 %      .9 fL      MCH 36.7 pg      MCHC 35.7 g/dL      RDW 14.6 %      RDW-SD 53.3 fl      MPV 11.3 fL      Platelets 224 10*3/mm3      Neutrophil % 85.5 %      Lymphocyte % 7.0 %      Monocyte % 6.8 %      Eosinophil % 0.0 %      Basophil % 0.3 %      Immature Grans % 0.4 %      Neutrophils, Absolute 13.81 10*3/mm3      Lymphocytes, Absolute 1.13 10*3/mm3      Monocytes, Absolute 1.09 10*3/mm3      Eosinophils, Absolute 0.00 10*3/mm3      Basophils, Absolute 0.05 10*3/mm3      Immature Grans, Absolute 0.06 10*3/mm3      nRBC 0.0 /100 WBC     POCT pregnancy, urine [[id number]]  (Normal) Collected: 08/31/21 1613    Specimen: Urine Updated: 08/31/21 1614     HCG, Urine, QL Negative     Lot Number \[id number]\     Internal Positive Control Positive     Internal Negative Control Negative        Imaging Results (Last 7 Days)     Procedure Component Value Units Date/Time    MRI abdomen w wo contrast mrcp [[id number]] Collected: 09/01/21 1751     Updated: 09/01/21 1759    Narrative:      EXAMINATION: MRI of the abdomen with and without contrast including MRCP  9/1/2021     HISTORY:  Severe pancreatitis     FINDINGS: Multiplanar fast spin echo sequences were obtained of the  upper abdomen on a high-field magnet both with and without gadolinium  enhancement. MRCP is also obtained with MIPS.     Trace pleural effusions are noted within the posterior costophrenic  angles with mild bibasilar atelectasis. The base of the heart is  unremarkable.     There is severe steatosis of the liver with marked dropout of signal in  comparing the in and out of phase sequencing through the upper abdomen.  No evidence of a discrete focal hepatic mass. There is normal  enhancement of the hepatic vasculature.     The gallbladder is unremarkable. There is free fluid within the  perihepatic and perisplenic space as well as free fluid and edema within  the anterior pararenal space surrounding the pancreatic gland consistent  with rather extensive pancreatitis. This is likely phlegmonous in  nature. There is normal enhancement of the pancreatic gland without  evidence of pancreatic necrosis. Free fluid is noted within both  paracolic gutters with slight increase in the ascitic fluid from  yesterday's CT exam.     The abdominal aorta and IVC are normal in caliber. Both kidneys are  normal in appearance. No evidence of obstructive uropathy.     MRCP images demonstrate the common hepatic and common bile duct to be  normal in caliber with no evidence of focal stricture or discrete  intraluminal filling defect. No evidence of intrahepatic biliary  dilatation.     The pancreatic duct is normal in caliber.       Impression:      1.. Extensive pancreatitis with phlegmonous fluid/edema surrounding the  pancreatic gland within the anterior pararenal space. There is also  reactive ascites with fluid in the perihepatic and perisplenic space as  well as within the upper paracolic gutters. The volume of ascitic fluid  has shown some increase from the previous exam. I do not see evidence of  pancreatic necrosis or discrete pancreatic  mass.  2. Severe steatosis of the liver with marked dropout of signal in  comparing in and out of phase sequencing through the liver. I do not see  evidence of a discrete focal hepatic mass.  3. MRCP images are essentially unremarkable with no evidence of intra or  extrahepatic biliary dilatation. No discrete stricture or intraluminal  filling defect is demonstrated. The pancreatic duct is normal in  caliber.  4. No foci of abnormal contrast enhancement are demonstrated. No  evidence of pancreatic necrosis.  This report was finalized on 09/01/2021 17:56 by Dr. Max Shah MD.    CT Abdomen Pelvis With Contrast [[id number]] Collected: 08/31/21 1733     Updated: 08/31/21 1749    Narrative:      CT ABDOMEN PELVIS W CONTRAST- 8/31/2021 5:19 PM CDT     HISTORY: abdominal pain; low back pain       COMPARISON: None.      DLP: 236 mGy cm. Automated exposure control was utilized to diminish  patient radiation dose.     TECHNIQUE: Following the intravenous administration of contrast, helical  CT tomographic images of the abdomen and pelvis were acquired. Coronal  reformatted images were also provided for review.      FINDINGS:   There is a pectus excavatum deformity of the lower sternum. The lung  bases are clear. The base of the heart is unremarkable..      LIVER: No focal liver lesion. The hepatic vasculature is patent. There  is severe steatosis of the liver.     BILIARY SYSTEM: The gallbladder is normal in caliber without definite  stone. There is some enhancement of the gallbladder wall. There is no  evidence of biliary dilatation..      PANCREAS: There is normal enhancement of the pancreas without evidence  of mass or pancreatic necrosis. However, there is fluid/edema  surrounding the pancreatic gland within the anterior pararenal space.  There is also fluid and mild induration within the greater omentum and  within the lesser sac. I would favor severe pancreatitis with  phlegmonous change. No discrete fluid  collection is present. There is a  small amount of ascites within the pelvis and paracolic gutters..      SPLEEN: Unremarkable.      KIDNEYS AND ADRENALS: Bilateral kidneys and adrenal glands are  unremarkable. The ureters are decompressed and normal in appearance.     RETROPERITONEUM: No mass, lymphadenopathy or hemorrhage.      GI TRACT: No evidence of obstruction or bowel wall thickening. The  appendix is visualized and unremarkable.     OTHER: There is mild phlegmonous induration of the greater omentum.  There is also some mild inflammatory edematous change at the root of the  small bowel mesentery. No evidence of mesenteric mass.. The  abdominopelvic vasculature is patent. The osseous structures and soft  tissues demonstrate no worrisome lesions. A small fat-containing  periumbilical hernia is present.      PELVIS: The uterus and adnexa are remarkable for a dominant follicle or  small right ovarian cyst measuring 1.5 cm in size. There is some free  fluid in the cul-de-sac.. The urinary bladder is normal in appearance.       Impression:      1. There is extensive fluid density and edematous change surrounding the  pancreatic gland in the anterior pararenal space. This extends into the  upper left paracolic gutter with associated thickening of the lateral  conal fascia and left anterior renal fascia. There is also  phlegmonous-like edematous change within the greater omentum. There is  some edema as well as a small amount of fluid within the lesser sac  along the lesser curvature of the stomach. There is ascites within the  paracolic gutters and pelvis. FINDINGS would suggest rather severe  pancreatitis. There is no evidence of pancreatic necrosis with  homogeneous enhancement of the pancreatic gland. No other complicating  features are appreciated.  2. Severe steatosis of the liver.  3. No evidence of nephrolithiasis or obstructive uropathy. There is a  normal bowel gas pattern.  4. Dominant follicle or small  "right ovarian cyst. A small amount of free  fluid within the cul-de-sac is likely related to the previously  described pancreatitis..         This report was finalized on 08/31/2021 17:46 by Dr. Max Shah MD.            Condition on Discharge:    Stable and improved    Physical Exam on Discharge:  /86 (BP Location: Right arm, Patient Position: Lying)   Pulse 75   Temp 98.2 °F (36.8 °C) (Oral)   Resp 18   Ht 170.2 cm (67\")   Wt 73.1 kg (161 lb 3.2 oz)   LMP 08/16/2021   SpO2 96%   BMI 25.25 kg/m²   Physical Exam     Constitutional:       General: She is not in acute distress.     Appearance: Normal appearance.   HENT:      Head: Normocephalic and atraumatic.      Right Ear: External ear normal.      Left Ear: External ear normal.      Mouth: Mucous membranes are moist.   Eyes:      General: No scleral icterus.     Conjunctiva/sclera: Conjunctivae normal.   Cardiovascular:      Rate and Rhythm: Normal rate and regular rhythm.      Pulses: Normal pulses.      Heart sounds: Normal heart sounds.   Pulmonary:      Effort: Pulmonary effort is normal.      Breath sounds: Normal breath sounds.   Abdominal:      General: Abdomen is flat.      Palpations: Abdomen is soft.      Tenderness: There is mild abdominal tenderness in the epigastric area which is significantly improved.   Musculoskeletal:         General: Normal range of motion.      Right lower leg: No edema.      Left lower leg: No edema.   Skin:     General: Skin is warm and dry.   Neurological:      General: No focal deficit present.      Mental Status: She is alert and oriented to person, place, and time. Mental status is at baseline.   Psychiatric:         Mood and Affect: Mood normal.         Judgment: Judgment normal.     Discharge Disposition:  Home or Self Care    Discharge Medications:     Discharge Medications      New Medications      Instructions Start Date   HYDROcodone-acetaminophen 7.5-325 MG per tablet  Commonly known as: NORCO   " 1 tablet, Oral, Every 6 Hours PRN             Discharge Diet:   Diet Instructions     Diet: Regular; Thin      Discharge Diet: Regular    Fluid Consistency: Thin    Low-fat diet and advance as tolerated          Discharge Care Plan / Instructions:   Discharge home    Activity at Discharge:   Activity Instructions     Activity as Tolerated            Follow-up Appointments:  Follow-up with family physician next week       Electronically signed by Jm Garza DO, 09/04/21, 17:46 CDT.    Time: Discharge Less than 30 min    Part of this note may be an electronic transcription/translation of spoken language to printed text using the Dragon Dictation system.

## 2021-09-04 NOTE — OUTREACH NOTE
Prep Survey      Responses   [facility] patient discharged from?  [place]   Is LACE score < 7 ?  No   Emergency Room discharge w/ pulse ox?  No   Eligibility  Readm Mgmt   Discharge diagnosis  Alcohol-induced acute pancreatitis,    COVID-19 virus detected   Does the patient have one of the following disease processes/diagnoses(primary or secondary)?  COVID-19   Does the patient have Home health ordered?  No   Is there a DME ordered?  No   Prep survey completed?  Yes          Maty Lin RN

## 2021-09-04 NOTE — PLAN OF CARE
Goal Outcome Evaluation:           Progress: improving  Outcome Summary: Pt up ad sajan.  IVF infusing.  Voiding.   Medicated for c/o pain and nausea with prn meds.  Tele-sinus.  Hoping to go home today.

## 2021-09-05 ENCOUNTER — READMISSION MANAGEMENT (OUTPATIENT)
Dept: CALL CENTER | Facility: HOSPITAL | Age: 31
End: 2021-09-05

## 2021-09-05 LAB
BACTERIA SPEC AEROBE CULT: NORMAL
BACTERIA SPEC AEROBE CULT: NORMAL

## 2021-09-05 NOTE — OUTREACH NOTE
COVID-19 Week 1 Survey      Responses   [facility] patient discharged from?  [place]   Does the patient have one of the following disease processes/diagnoses(primary or secondary)?  COVID-19   COVID-19 underlying condition?  None   Call Number  Call 1   Week 1 Call successful?  No   Discharge diagnosis  Alcohol-induced acute pancreatitis,    COVID-19 virus detected          Cait Cee RN

## 2021-09-06 ENCOUNTER — READMISSION MANAGEMENT (OUTPATIENT)
Dept: CALL CENTER | Facility: HOSPITAL | Age: 31
End: 2021-09-06

## 2021-09-06 NOTE — OUTREACH NOTE
COVID-19 Week 1 Survey      Responses   [facility] patient discharged from?  [place]   Does the patient have one of the following disease processes/diagnoses(primary or secondary)?  COVID-19   COVID-19 underlying condition?  None   Call Number  Call 2   Week 1 Call successful?  No   Discharge diagnosis  Alcohol-induced acute pancreatitis,    COVID-19 virus detected          Margarita Whyte RN

## 2021-09-07 ENCOUNTER — READMISSION MANAGEMENT (OUTPATIENT)
Dept: CALL CENTER | Facility: HOSPITAL | Age: 31
End: 2021-09-07

## 2021-09-07 NOTE — OUTREACH NOTE
COVID-19 Week 1 Survey      Responses   [facility] patient discharged from?  [place]   Does the patient have one of the following disease processes/diagnoses(primary or secondary)?  COVID-19   COVID-19 underlying condition?  None   Call Number  Call 3   Week 1 Call successful?  Yes   Call start time  1533   Call end time  1536   Discharge diagnosis  Alcohol-induced acute pancreatitis,    COVID-19 virus detected   Person spoke with today (if not patient) and relationship  Jose,    Meds reviewed with patient/caregiver?  Yes   Is the patient having any side effects they believe may be caused by any medication additions or changes?  No   Does the patient have all medications ordered at discharge?  Yes   Is the patient taking all medications as directed (includes completed medication regime)?  Yes   Does the patient have a primary care provider?   Yes   Does the patient have an appointment with their PCP or specialist within 7 days of discharge?  No   What is preventing the patient from scheduling follow up appointments within 7 days of discharge?  [place]'t had time   Nursing Interventions  Advised patient to make appointment   Has the patient kept scheduled appointments due by today?  N/A   Has home health visited the patient within 72 hours of discharge?  N/A   Psychosocial issues?  No   Did the patient receive a copy of their discharge instructions?  Yes   Did the patient receive a copy of COVID-19 specific instructions?  Yes   Nursing interventions  Reviewed instructions with patient   What is the patient's perception of their health status since discharge?  Improving   Does the patient have any of the following symptoms?  None   Nursing Interventions  Nurse provided patient education   Pulse Ox monitoring  None   Is the patient/caregiver able to teach back steps to recovery at home?  Set small, achievable goals for return to baseline health, Rest and rebuild strength, gradually increase activity, Eat a  well-balance diet   Is the patient/caregiver able to teach back the hierarchy of who to call/visit for symptoms/problems? PCP, Specialist, Home health nurse, Urgent Care, ED, 911  Yes [states pt's is in back, tolerating modified diet to avoid pancreatitis]   COVID-19 call completed?  Yes          Margarita Whyte RN

## 2021-09-10 ENCOUNTER — READMISSION MANAGEMENT (OUTPATIENT)
Dept: CALL CENTER | Facility: HOSPITAL | Age: 31
End: 2021-09-10

## 2021-09-10 NOTE — OUTREACH NOTE
COVID-19 Week 2 Survey      Responses   [facility] patient discharged from?  [place]   Does the patient have one of the following disease processes/diagnoses(primary or secondary)?  COVID-19   COVID-19 underlying condition?  None   Call Number  Call 1   COVID-19 Week 2: Call 1 attempt successful?  No   Discharge diagnosis  Alcohol-induced acute pancreatitis,    COVID-19 virus detected          Ryann Hutchins RN

## 2021-09-29 ENCOUNTER — NURSE TRIAGE (OUTPATIENT)
Dept: CALL CENTER | Facility: HOSPITAL | Age: 31
End: 2021-09-29

## 2021-09-29 NOTE — TELEPHONE ENCOUNTER
"    Reason for Disposition  • General information question, no triage required and triager able to answer question    Additional Information  • Negative: [1] Caller is not with the adult (patient) AND [2] reporting urgent symptoms  • Negative: Lab result questions  • Negative: Medication questions  • Negative: Caller can't be reached by phone  • Negative: Caller has already spoken to PCP or another triager  • Negative: RN needs further essential information from caller in order to complete triage  • Negative: Requesting regular office appointment  • Negative: [1] Caller requesting NON-URGENT health information AND [2] PCP's office is the best resource  • Negative: Health Information question, no triage required and triager able to answer question    Answer Assessment - Initial Assessment Questions  1. REASON FOR CALL or QUESTION: \"What is your reason for calling today?\" or \"How can I best help you?\" or \"What question do you have that I can help answer?\"      Caller asking if she received antibiotic while hospitalized. Medications reviewed question answered.    Protocols used: INFORMATION ONLY CALL - NO TRIAGE-ADULT-      "

## 2021-10-11 ENCOUNTER — OFFICE VISIT (OUTPATIENT)
Dept: GASTROENTEROLOGY | Facility: CLINIC | Age: 31
End: 2021-10-11

## 2021-10-11 VITALS
SYSTOLIC BLOOD PRESSURE: 124 MMHG | HEART RATE: 77 BPM | TEMPERATURE: 97.1 F | BODY MASS INDEX: 23.04 KG/M2 | OXYGEN SATURATION: 100 % | WEIGHT: 152 LBS | HEIGHT: 68 IN | DIASTOLIC BLOOD PRESSURE: 78 MMHG

## 2021-10-11 DIAGNOSIS — Z87.898 HISTORY OF NAUSEA AND VOMITING: ICD-10-CM

## 2021-10-11 DIAGNOSIS — Z87.19 HISTORY OF PANCREATITIS: Primary | ICD-10-CM

## 2021-10-11 DIAGNOSIS — R74.8 ELEVATED LIVER ENZYMES: ICD-10-CM

## 2021-10-11 DIAGNOSIS — F10.21 HISTORY OF ALCOHOLISM (HCC): ICD-10-CM

## 2021-10-11 PROCEDURE — 99214 OFFICE O/P EST MOD 30 MIN: CPT | Performed by: NURSE PRACTITIONER

## 2021-10-11 RX ORDER — MULTIPLE VITAMINS W/ MINERALS TAB 9MG-400MCG
1 TAB ORAL DAILY
COMMUNITY

## 2021-10-11 NOTE — PROGRESS NOTES
"Chief Complaint:   Chief Complaint   Patient presents with   • Follow-up     Pt presents today for hospital follow up-was in [facility] 8/31/21 with pancreatitis; Pt states she has been following a very low-fat diet and is feeling a lot better         Patient ID: Shwetha Tobias is a 30 y.o. female     History of Present Illness: This is a very pleasant 30-year-old female who is here to follow-up status post hospitalization for elevated liver enzymes and pancreatitis.    The patient was admitted to [facility] on 8/31/2021 for alcohol induced acute pancreatitis without infection or necrosis.  COVID-19 virus was also detected.  Hyperbilirubinemia and elevated LFTs.  The patient does tell me that she had been fully vaccinated before acquiring Covid.  The patient presented to the hospital with complaints of \"boring type of pain that starts in the front and goes to my back.\"  The patient noted she had initially thought she was recovering from Salmonella as her whole family had had Salmonella infection.  She noted her symptoms to worsen over the 2 previous days.  She had intractable nausea and vomiting along with diarrhea.  The patient states that she does drink alcohol and has been trying to stop over the past 2 weeks.  The patient did not exhibit any evidence of DTs throughout her hospitalization.  Screen was positive for THC, opioids and benzodiazepines.  Patient was treated with antiemetics as well as pain medication.  MRCP was ordered which revealed no evidence of ductal obstruction.  There was evidence of phlegmon development consistent with severe pancreatitis.  The patient states that she has not had any known pancreatitis in the past.  Patient was consulted by GI and by discharge lipase and LFTs had both trended to normal.  The patient tells me that she has been drinking alcohol for approximately 4 years \"not every night but many nights I would drink half pint of vodka or wine.\"  The patient states that she has " stopped drinking since discharge from the hospital.  She states her pain along with nausea and vomiting have resolved.  She states she is still eating a bland diet and plans on continuing to avoid greasy foods and alcohol.    The patient denies any nausea, vomiting, epigastric pain, dysphagia, pyrosis or hematemesis.  The patient denies any fever or chills.  Denies any melena or hematochezia.  Denies any unintentional weight loss or loss of appetite.          Study Result    Narrative & Impression   EXAMINATION: MRI of the abdomen with and without contrast including MRCP  9/1/2021     HISTORY: Severe pancreatitis   IMPRESSION:  1.. Extensive pancreatitis with phlegmonous fluid/edema surrounding the  pancreatic gland within the anterior pararenal space. There is also  reactive ascites with fluid in the perihepatic and perisplenic space as  well as within the upper paracolic gutters. The volume of ascitic fluid  has shown some increase from the previous exam. I do not see evidence of  pancreatic necrosis or discrete pancreatic mass.  2. Severe steatosis of the liver with marked dropout of signal in  comparing in and out of phase sequencing through the liver. I do not see  evidence of a discrete focal hepatic mass.  3. MRCP images are essentially unremarkable with no evidence of intra or  extrahepatic biliary dilatation. No discrete stricture or intraluminal  filling defect is demonstrated. The pancreatic duct is normal in  caliber.  4. No foci of abnormal contrast enhancement are demonstrated. No  evidence of pancreatic necrosis.  This report was finalized on 09/01/2021 17:56 by Dr. Max Shah MD.      Past Medical History:   Diagnosis Date   • Ovarian cyst    • Stomach ulcer        Past Surgical History:   Procedure Laterality Date   • OVARIAN CYST REMOVAL           Current Outpatient Medications:   •  multivitamin with minerals (Multivitamin Adults) tablet tablet, Take 1 tablet by mouth Daily., Disp: , Rfl:  "    No Known Allergies    Social History     Socioeconomic History   • Marital status:    Tobacco Use   • Smoking status: Former Smoker   • Smokeless tobacco: Never Used   Vaping Use   • Vaping Use: Never used   Substance and Sexual Activity   • Alcohol use: Not Currently   • Drug use: Yes     Types: Marijuana   • Sexual activity: Defer       Family History   Problem Relation Age of Onset   • Cancer Father         Bladder and Myeloid Leukemia   • Colon polyps Neg Hx    • Colon cancer Neg Hx    • Esophageal cancer Neg Hx    • Liver cancer Neg Hx    • Liver disease Neg Hx    • Rectal cancer Neg Hx    • Stomach cancer Neg Hx        Vitals:    10/11/21 1314   BP: 124/78   BP Location: Left arm   Patient Position: Sitting   Cuff Size: Adult   Pulse: 77   Temp: 97.1 °F (36.2 °C)   TempSrc: Infrared   SpO2: 100%   Weight: 68.9 kg (152 lb)   Height: 172.7 cm (68\")       Review of Systems:    General:    Present -feeling well   Skin:    Not Present-Rash   HEENT:     Not Present-Acute visual changes or Acute hearing changes   Neck :    Not Present- swollen glands   Genitourinary:      Not Present- burning, frequency, urgency hematuria, dysuria,   Cardiovascular:   Not Present-chest pain, palpitations, or pressure   Respiratory:   Not Present- shortness of breath or cough   Gastrointestinal:  Musculoskeletal:  Neurological:  Psychiatric:   Present as mentioned in the HP    Not Present. Recent gait disturbances.    Not Present-Seizures and weakness in extremities.    Not Present- Anxiety or Depression.       Physical Exam:    General Appearance:    Alert, cooperative, in no acute distress   Psych:    Mood appropriate    Eyes:          conjunctivae and sclerae normal, no   icterus, no pallor   ENMT:    Ears appear intact with no abnormalities noted oral mucosa moist   Neck:   No adenopathy, supple, trachea midline, no thyromegaly, no   carotid bruit, no JVD    Cardiovascular:    Regular rhythm and normal rate, normal S1 " and S2, no            murmur, no gallop, no rub, no click   Gastrointestinal:     Inspection normal.  Normal bowel sounds, no masses, no organomegaly, soft round non-tender, non-distended, no guarding, no rebound or tenderness. No hepatosplenomegaly.   Skin:   No bleeding, bruising or rash   Neurologic:   nonfocal       Lab Results - Last 18 Months   Lab Units 09/04/21  0322 09/03/21  0455 09/02/21 0456 09/01/21 0620 08/31/21  1606   GLUCOSE mg/dL 86 80 89 123* 107*   BUN mg/dL 3* 3* 4* 6 8   CREATININE mg/dL 0.31* 0.30* 0.35* 0.47* 0.50*   SODIUM mmol/L 136 132* 141 142 139   POTASSIUM mmol/L 3.5 3.9 3.5 3.8 3.4*   CHLORIDE mmol/L 98 96* 103 97* 90*   CO2 mmol/L 26.0 25.0 26.0 24.0 16.0*   TOTAL PROTEIN g/dL 5.4* 5.2* 5.2* 5.9* 7.5   ALBUMIN g/dL 2.70* 2.80* 3.00* 3.50 4.40   ALT (SGPT) U/L 35* 39* 49* 77* 114*   AST (SGOT) U/L 58* 55* 69* 112* 253*   ALK PHOS U/L 106 89 78 94 130*   BILIRUBIN mg/dL 0.8 1.1 1.6* 1.8* 1.7*   GLOBULIN gm/dL 2.7 2.4 2.2 2.4 3.1       Lab Results - Last 18 Months   Lab Units 09/02/21 0456 09/01/21 0620 08/31/21 2007 08/31/21  1606   HEMOGLOBIN g/dL 13.4 16.5*  --  18.9*   HEMATOCRIT % 39.3 47.0*  --  53.0*   MCV fL 107.1* 103.8*  --  102.9*   WBC 10*3/mm3 9.02 12.28*  --  16.14*   RDW % 14.9 14.8  --  14.6   MPV fL 12.3* 11.9  --  11.3   PLATELETS 10*3/mm3 84* 143  --  224   INR   --   --  1.16*  --        Lab Results - Last 18 Months   Lab Units 08/31/21  1606   FERRITIN ng/mL 1,048.00*        Lab Results - Last 18 Months   Lab Units 08/31/21 2007 08/31/21  1606   HEP B C IGM  Non-Reactive  --    HEP B S AG  Non-Reactive  --    FERRITIN ng/mL  --  1,048.00*           Assessment and Plan:  Assessment/Plan   Diagnoses and all orders for this visit:    1. History of pancreatitis (Primary)  -     Cancel: Comprehensive Metabolic Panel; Future  -     Cancel: CBC & Differential; Future  -     Cancel: C-reactive Protein; Future  -     Cancel: Amylase; Future  -     Cancel: Lipase;  Future  -     CBC & Differential; Future  -     Comprehensive Metabolic Panel; Future  -     Amylase; Future  -     Lipase; Future  -     C-reactive Protein; Future    2. Elevated liver enzymes  -     Cancel: Comprehensive Metabolic Panel; Future  -     Cancel: CBC & Differential; Future  -     Cancel: C-reactive Protein; Future  -     Cancel: Amylase; Future  -     Cancel: Lipase; Future  -     CBC & Differential; Future  -     Comprehensive Metabolic Panel; Future  -     Amylase; Future  -     Lipase; Future  -     C-reactive Protein; Future    3. History of alcoholism (HCC)  -     CBC & Differential; Future  -     Comprehensive Metabolic Panel; Future  -     Amylase; Future  -     Lipase; Future  -     C-reactive Protein; Future    4. History of nausea and vomiting  -     CBC & Differential; Future  -     Comprehensive Metabolic Panel; Future  -     Amylase; Future  -     Lipase; Future  -     C-reactive Protein; Future      I have reviewed the patient's chart with Dr. Quiles.  Will repeat labs as noted above.  Follow-up with Dr. Quiles in 6 months liver.     There are no Patient Instructions on file for this visit.    Next follow-up appointment        EMR Dragon/Transcription disclaimer:  Much of this encounter note is an electronic transcription/translation of spoken language to printed text. The electronic translation of spoken language may permit erroneous, or at times, nonsensical words or phrases to be inadvertently transcribed; although I have reviewed the note for such errors, some may still exist.

## 2024-05-28 ENCOUNTER — OFFICE VISIT (OUTPATIENT)
Dept: FAMILY MEDICINE CLINIC | Facility: CLINIC | Age: 34
End: 2024-05-28
Payer: COMMERCIAL

## 2024-05-28 ENCOUNTER — HOSPITAL ENCOUNTER (OUTPATIENT)
Dept: GENERAL RADIOLOGY | Facility: HOSPITAL | Age: 34
Discharge: HOME | End: 2024-05-28
Admitting: NURSE PRACTITIONER
Payer: COMMERCIAL

## 2024-05-28 ENCOUNTER — PATIENT ROUNDING (BHMG ONLY) (OUTPATIENT)
Dept: FAMILY MEDICINE CLINIC | Facility: CLINIC | Age: 34
End: 2024-05-28
Payer: COMMERCIAL

## 2024-05-28 VITALS
WEIGHT: 141 LBS | SYSTOLIC BLOOD PRESSURE: 101 MMHG | HEART RATE: 95 BPM | DIASTOLIC BLOOD PRESSURE: 70 MMHG | TEMPERATURE: 98.8 F | OXYGEN SATURATION: 97 % | HEIGHT: 68 IN | BODY MASS INDEX: 21.37 KG/M2 | RESPIRATION RATE: 20 BRPM

## 2024-05-28 DIAGNOSIS — M25.551 RIGHT HIP PAIN: Primary | ICD-10-CM

## 2024-05-28 DIAGNOSIS — M54.41 ACUTE RIGHT-SIDED LOW BACK PAIN WITH RIGHT-SIDED SCIATICA: ICD-10-CM

## 2024-05-28 DIAGNOSIS — M25.551 RIGHT HIP PAIN: ICD-10-CM

## 2024-05-28 PROCEDURE — 72100 X-RAY EXAM L-S SPINE 2/3 VWS: CPT

## 2024-05-28 PROCEDURE — 99214 OFFICE O/P EST MOD 30 MIN: CPT | Performed by: NURSE PRACTITIONER

## 2024-05-28 PROCEDURE — 73502 X-RAY EXAM HIP UNI 2-3 VIEWS: CPT

## 2024-05-28 RX ORDER — DEXTROAMPHETAMINE SACCHARATE, AMPHETAMINE ASPARTATE, DEXTROAMPHETAMINE SULFATE AND AMPHETAMINE SULFATE 1.25; 1.25; 1.25; 1.25 MG/1; MG/1; MG/1; MG/1
TABLET ORAL
COMMUNITY
Start: 2024-04-22

## 2024-05-28 RX ORDER — GABAPENTIN 100 MG/1
CAPSULE ORAL
COMMUNITY
Start: 2024-04-22

## 2024-05-28 RX ORDER — PREDNISONE 10 MG/1
TABLET ORAL
Qty: 1 EACH | Refills: 0 | Status: SHIPPED | OUTPATIENT
Start: 2024-05-28 | End: 2024-05-30 | Stop reason: SDUPTHER

## 2024-05-28 RX ORDER — NAPROXEN 500 MG/1
500 TABLET ORAL 2 TIMES DAILY WITH MEALS
Qty: 60 TABLET | Refills: 0 | Status: SHIPPED | OUTPATIENT
Start: 2024-05-28 | End: 2024-06-27

## 2024-05-28 RX ORDER — TIZANIDINE 4 MG/1
TABLET ORAL
Qty: 30 TABLET | Refills: 0 | Status: SHIPPED | OUTPATIENT
Start: 2024-05-28

## 2024-05-28 NOTE — PROGRESS NOTES
Zeynep Maldonadotreet APRMARILYNN  [facility]   Family Medicine  [address]  [place] [address]  Phone: [phone]  Fax: [phone]         Chief Complaint:  Chief Complaint   Patient presents with    [facility]    Back Pain        History:  Shwetha Tobias is a 33 y.o. female.  History of Present Illness  The patient is a 32-year-old female who is here today to establish [facility] and having some issues with back pain.    The patient has been experiencing right hip pain for several years, typically triggered by prolonged driving, which typically resolves spontaneously. However, a few weeks ago, she experienced a flare-up of her pain localized to the piriform area, particularly when bending. The pain has escalated to the point of disrupting her sleep. On Saturday, while driving, she experienced a severe episode of near syncope and vomiting, which persisted until reaching the gas station for approximately 15 minutes. She describes the pain as intense, likening it to a shattered hip, hot sensation, dizziness, cold sweats, and a sensation of heat in her hip. Despite the pain, she was able to ambulate around her car and return home. Since then, she has been unable to drive due to the pain. Standing does not exacerbate the pain, but it impedes her ability to sit, drive, and sleep. Upon waking, she can barely ambulate for approximately an hour. The pain radiates throughout her leg, and she reports a loss of sensation in her big toe and a tingling sensation in the anterior aspect of her shin. She also reports a catching sensation in her leg when sitting or lying down for extended periods, which does not improve with walking. She has undergone x-rays and acupuncture, but has not recently visited a chiropractor. She has been sleeping in a certain position for approximately a week, which initially provides relief, but the pain gradually increases to every position. 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 She has noticed that her jeans occasionally drag on one side, and she wonders if her skeletal alignment may be contributing to her symptoms. She has previously tried Medrol Dosepak, which resulted in moodiness and physical discomfort. She has been performing gentle exercises that do not cause pain. She has been taking ibuprofen, which provides some relief. She has found that placing a blanket under her pelvis while lying on her stomach is the only thing that allows her to sleep.    Supplemental Information  She has rotator cuff issues.   The patient denies alcohol intake.   Her mother has hip dysplasia and a hip replacement.           ROS:  Review of Systems   Constitutional:  Negative for fatigue, fever and unexpected weight change.   HENT:  Negative for congestion, ear pain, rhinorrhea, sinus pressure, sinus pain and voice change.    Eyes:  Negative for visual disturbance.   Respiratory:  Negative for shortness of breath and wheezing.    Cardiovascular:  Negative for chest pain and palpitations.   Gastrointestinal:  Negative for abdominal pain, nausea and vomiting.   Genitourinary:  Negative for dysuria and flank pain.   Musculoskeletal:  Positive for arthralgias (right hip pain x 2 years.) and back pain. Negative for myalgias and neck pain.   Skin:  Negative for color change and rash.   Neurological:  Negative for dizziness, weakness, numbness and headaches.   Psychiatric/Behavioral:  Negative for behavioral problems, dysphoric mood, self-injury and sleep disturbance.         reports that she has been smoking cigarettes. She has never used smokeless tobacco. She reports that she does not currently use alcohol. She reports current drug use. Drug: Marijuana.    Current Outpatient Medications   Medication Instructions    amphetamine-dextroamphetamine (ADDERALL) 5 MG tablet     gabapentin (NEURONTIN) 100 MG capsule     multivitamin with  "minerals (Multivitamin Adults) tablet tablet 1 tablet, Oral, Daily    naproxen (NAPROSYN) 500 mg, Oral, 2 Times Daily With Meals    predniSONE (DELTASONE) 10 MG (48) dose pack Take as directed.    tiZANidine (ZANAFLEX) 4 MG tablet Take 1/2 - 1 tablet at bedtime. Do not drive for 8 hours after taking.    VITAMIN D PO Oral       OBJECTIVE:  /70 (BP Location: Left arm, Patient Position: Sitting, Cuff Size: Adult)   Pulse 95   Temp 98.8 °F (37.1 °C) (Infrared)   Resp 20   Ht 172.7 cm (67.99\")   Wt 64 kg (141 lb)   SpO2 97%   BMI 21.44 kg/m²    Physical Exam  Vitals and nursing note reviewed.   Constitutional:       Appearance: Normal appearance. She is well-developed.   HENT:      Head: Normocephalic and atraumatic.      Right Ear: Tympanic membrane, ear canal and external ear normal.      Left Ear: Tympanic membrane, ear canal and external ear normal.      Nose: Nose normal. No septal deviation, nasal tenderness or congestion.      Mouth/Throat:      Lips: Pink. No lesions.      Mouth: Mucous membranes are moist. No oral lesions.      Dentition: Normal dentition.      Pharynx: Oropharynx is clear. No pharyngeal swelling, oropharyngeal exudate or posterior oropharyngeal erythema.   Eyes:      General: Lids are normal. Vision grossly intact. No scleral icterus.        Right eye: No discharge.         Left eye: No discharge.      Extraocular Movements: Extraocular movements intact.      Conjunctiva/sclera: Conjunctivae normal.      Right eye: Right conjunctiva is not injected.      Left eye: Left conjunctiva is not injected.      Pupils: Pupils are equal, round, and reactive to light.   Neck:      Thyroid: No thyroid mass.      Trachea: Trachea normal.   Cardiovascular:      Rate and Rhythm: Normal rate and regular rhythm.      Heart sounds: Normal heart sounds. No murmur heard.     No gallop.   Pulmonary:      Effort: Pulmonary effort is normal.      Breath sounds: Normal breath sounds and air entry. No " wheezing, rhonchi or rales.   Musculoskeletal:         General: No tenderness or deformity. Normal range of motion.      Cervical back: Full passive range of motion without pain, normal range of motion and neck supple.      Thoracic back: Normal.        Back:       Right lower leg: No edema.      Left lower leg: No edema.      Comments: Tenderness with palpation.    Skin:     General: Skin is warm and dry.      Coloration: Skin is not jaundiced.      Findings: No rash.   Neurological:      Mental Status: She is alert and oriented to person, place, and time.      Sensory: Sensation is intact.      Motor: Motor function is intact.      Coordination: Coordination is intact.      Gait: Gait is intact.      Deep Tendon Reflexes: Reflexes are normal and symmetric.   Psychiatric:         Mood and Affect: Mood and affect normal.         Behavior: Behavior normal.         Judgment: Judgment normal.       Physical Exam  Positive straight leg raise on the right musculoskeletal system.    BMI is within normal parameters. No other follow-up for BMI required.    Procedures    Results      Assessment/Plan:     Diagnoses and all orders for this visit:    1. Right hip pain (Primary)  -     XR Hip With or Without Pelvis 2 - 3 View Left; Future    2. Acute right-sided low back pain with right-sided sciatica  -     XR Spine Lumbar 2 or 3 View; Future  -     XR Hip With or Without Pelvis 2 - 3 View Left; Future  -     predniSONE (DELTASONE) 10 MG (48) dose pack; Take as directed.  Dispense: 1 each; Refill: 0  -     naproxen (Naprosyn) 500 MG tablet; Take 1 tablet by mouth 2 (Two) Times a Day With Meals for 30 days.  Dispense: 60 tablet; Refill: 0  -     tiZANidine (ZANAFLEX) 4 MG tablet; Take 1/2 - 1 tablet at bedtime. Do not drive for 8 hours after taking.  Dispense: 30 tablet; Refill: 0          An After Visit Summary was printed and given to the patient at discharge.  Return if symptoms worsen or fail to improve.       Assessment &  Plan  1. Sciatica.  The patient's symptoms suggest an inflamed bursa or disc shift at L4-L5. An x-ray of the lumbar spine and right hip will be ordered. A prescription for prednisone and naproxen, to be taken twice daily with meals, will be provided. The patient is advised to use a body pillow between her legs to maintain spinal alignment during sleep. She is also advised to perform yoga poses. A prescription for tizanidine will be provided, starting with half a tablet at bedtime and then increasing to a full tablet as needed. The patient is cautioned against driving after taking tizanidine.    I spent 31 minutes caring for Shwetha on this date of service. This time includes time spent by me in the following activities: preparing for the visit, reviewing tests, obtaining and/or reviewing a separately obtained history, performing a medically appropriate examination and/or evaluation, counseling and educating the patient/family/caregiver, ordering medications, tests, or procedures, documenting information in the medical record, independently interpreting results and communicating that information with the patient/family/caregiver, and care coordination     Zeynep DUBON 5/28/2024   Electronically signed.    Patient or patient representative verbalized consent for the use of Ambient Listening during the visit with  JAGDISH Carlson for chart documentation. 5/28/2024  21:18 CDT

## 2024-05-28 NOTE — PATIENT INSTRUCTIONS
Xray of lumbar spine and right hip at [facility]     Begin steroids, muscle relaxer and anti-inflammatory as directed.

## 2024-05-28 NOTE — PROGRESS NOTES
May 28, 2024    Hello, may I speak with Shwetha Tobias?    My name is Julia      I am  with [facility]  [address] [place] [id number] [phone].    Before we get started may I verify your date of birth? 1990    I am calling to officially welcome you to our practice and ask about your recent visit. Is this a good time to talk? yes    Tell me about your visit with us. What things went well?  Everyone was very friendly, informative, and fast.       We're always looking for ways to make our patients' experiences even better. Do you have recommendations on ways we may improve?  no    Overall were you satisfied with your first visit to our practice? yes       I appreciate you taking the time to speak with me today. Is there anything else I can do for you? no      Thank you, and have a great day.

## 2024-05-29 ENCOUNTER — TELEPHONE (OUTPATIENT)
Dept: FAMILY MEDICINE CLINIC | Facility: CLINIC | Age: 34
End: 2024-05-29
Payer: COMMERCIAL

## 2024-05-29 ENCOUNTER — PATIENT MESSAGE (OUTPATIENT)
Dept: PHYSICAL THERAPY | Facility: CLINIC | Age: 34
End: 2024-05-29
Payer: COMMERCIAL

## 2024-05-29 DIAGNOSIS — M54.41 ACUTE RIGHT-SIDED LOW BACK PAIN WITH RIGHT-SIDED SCIATICA: ICD-10-CM

## 2024-05-29 DIAGNOSIS — M25.551 RIGHT HIP PAIN: Primary | ICD-10-CM

## 2024-05-29 NOTE — TELEPHONE ENCOUNTER
Insurance will not pay for an MRI until she has completed physical therapy.  I will place the order for physical therapy and she can go to the physical therapy location of choice.

## 2024-05-29 NOTE — TELEPHONE ENCOUNTER
Caller: Shwteha Tobias    Relationship: Self    Best call back number:     [phone] (Home), REQUESTING A CALLBACK     What test was performed: XRAY'S    When was the test performed:  5/28/24    Where was the test performed:  [facility]

## 2024-05-30 ENCOUNTER — TELEPHONE (OUTPATIENT)
Dept: FAMILY MEDICINE CLINIC | Facility: CLINIC | Age: 34
End: 2024-05-30
Payer: COMMERCIAL

## 2024-05-30 DIAGNOSIS — M54.41 ACUTE RIGHT-SIDED LOW BACK PAIN WITH RIGHT-SIDED SCIATICA: ICD-10-CM

## 2024-05-30 RX ORDER — PREDNISONE 10 MG/1
TABLET ORAL
Qty: 1 EACH | Refills: 0 | Status: SHIPPED | OUTPATIENT
Start: 2024-05-30

## 2024-05-30 NOTE — TELEPHONE ENCOUNTER
Caller: Shwetha Tobias    Relationship: Self    Best call back number: [phone]     Requested Prescriptions:   Requested Prescriptions     Pending Prescriptions Disp Refills    predniSONE (DELTASONE) 10 MG (48) dose pack 1 each 0     Sig: Take as directed.        Pharmacy where request should be sent: [facility] [place], [place] - [address] [phone] [facility] [phone]      Last office visit with prescribing clinician: 5/28/2024   Last telemedicine visit with prescribing clinician: Visit date not found   Next office visit with prescribing clinician: Visit date not found     Additional details provided by patient: NEW PRESCRIPTION, PATIENT STATES THE ORIGINAL PHARMACY WAS OUT OF STOCK OF THIS MEDICATION.     Does the patient have less than a 3 day supply:  [x] Yes  [] No    Would you like a call back once the refill request has been completed: [] Yes [] No    If the office needs to give you a call back, can they leave a voicemail: [] Yes [] No    Gabriele Menchaca Rep   05/30/24 09:06 CDT

## 2024-06-04 ENCOUNTER — TELEPHONE (OUTPATIENT)
Dept: FAMILY MEDICINE CLINIC | Facility: CLINIC | Age: 34
End: 2024-06-04
Payer: COMMERCIAL

## 2024-06-04 NOTE — TELEPHONE ENCOUNTER
Caller:     Shwetha Tobias        Relationship: SELF     Best call back number:     [phone]        What is the best time to reach you:  ANYTIME   Who are you requesting to speak with (clinical staff, provider,  specific staff member):  CLINICAL     Do you know the name of the person who called: CLINICAL     What was the call regarding: SHE STATES PHYSICAL THERAPY IS NOT ABLE TO GET HER IN UNTIL JUNE 25 AND SHE STATES SHE IS HAVING MORE PAIN THAN WHEN SHE WAS SEEN IN THE OFFICE SHE STATES SHE HAD A BACK SPASM TODAY 06/04/24 SHE WOULD LIKE TO BE ADVISED     Is it okay if the provider responds through MyChart:  CALL BACK REQUEST

## 2024-06-06 ENCOUNTER — PATIENT MESSAGE (OUTPATIENT)
Dept: FAMILY MEDICINE CLINIC | Facility: CLINIC | Age: 34
End: 2024-06-06
Payer: COMMERCIAL

## 2024-06-06 DIAGNOSIS — M54.41 ACUTE RIGHT-SIDED LOW BACK PAIN WITH RIGHT-SIDED SCIATICA: Primary | ICD-10-CM

## 2024-06-06 RX ORDER — METHYLPREDNISOLONE 4 MG/1
TABLET ORAL
Qty: 21 TABLET | Refills: 0 | Status: SHIPPED | OUTPATIENT
Start: 2024-06-06

## 2024-06-06 NOTE — TELEPHONE ENCOUNTER
I will have to send to [place] to get her in sooner. If Pt is okay with this then yes, I will send over today.

## 2024-06-11 ENCOUNTER — OFFICE VISIT (OUTPATIENT)
Dept: FAMILY MEDICINE CLINIC | Facility: CLINIC | Age: 34
End: 2024-06-11
Payer: COMMERCIAL

## 2024-06-11 VITALS
OXYGEN SATURATION: 99 % | HEART RATE: 103 BPM | DIASTOLIC BLOOD PRESSURE: 81 MMHG | SYSTOLIC BLOOD PRESSURE: 120 MMHG | WEIGHT: 140.13 LBS | RESPIRATION RATE: 18 BRPM | TEMPERATURE: 97.4 F | HEIGHT: 68 IN | BODY MASS INDEX: 21.24 KG/M2

## 2024-06-11 DIAGNOSIS — M54.41 ACUTE RIGHT-SIDED LOW BACK PAIN WITH RIGHT-SIDED SCIATICA: Primary | ICD-10-CM

## 2024-06-11 DIAGNOSIS — M25.551 RIGHT HIP PAIN: ICD-10-CM

## 2024-06-11 PROCEDURE — 99213 OFFICE O/P EST LOW 20 MIN: CPT | Performed by: NURSE PRACTITIONER

## 2024-06-11 RX ORDER — NAPROXEN 500 MG/1
500 TABLET ORAL 2 TIMES DAILY WITH MEALS
Qty: 60 TABLET | Refills: 0 | Status: SHIPPED | OUTPATIENT
Start: 2024-06-11 | End: 2024-07-11

## 2024-06-11 NOTE — PROGRESS NOTES
" JAGDISH Carlson  [facility]   Family Medicine  [address]  [place] [address]  Phone: [phone]  Fax: [phone]         Chief Complaint:  Chief Complaint   Patient presents with    Back Pain        History:  Shwetha Tobias is a 33 y.o. female.  History of Present Illness  The patient presents for evaluation of right leg pain.    The patient experienced a severe back injury that extended to her toe, accompanied by a sensation of her entire leg \"about to explode\". She nearly sought emergency care but was unable to mobilize for several days. She reports a loss of sensation below her knee, accompanied by pain and a sensation akin to shin splints. The shin splints began to subside yesterday, but she experienced complete numbness in her big toe until yesterday, which has since begun to tingle. She has been unable to work or drive since Tuesday until Saturday due to cramping after approximately 10 minutes of walking. Lying down alleviates the cramping. Her walking ability was limited to 5 to 10 minutes until Saturday, which improved her condition. Despite not lifting anything heavier than 10 pounds, she continued to experience significant pain, limiting her ability to drive. She describes a sudden, sharp pain in her back that radiates down to her leg. She sought chiropractic treatment at [facility] last Thursday, where she was diagnosed with L5 V. She reported a swollen vein over her knee while in the shower, which was not painful or red. Her sleep has been disrupted, and she has been waking up with cold sweats. She has been taking Zanaflex, which makes her sleepy, but she wakes up in an hour. Her symptoms appear to be positional. She has completed her course of prednisone and was then switched to methylprednisolone. She continues to take naproxen and gabapentin for sleep.       ROS:  Review of Systems   Constitutional:  Negative for fatigue, fever and unexpected weight " "change.   HENT:  Negative for congestion, ear pain, rhinorrhea, sinus pressure, sinus pain and voice change.    Eyes:  Negative for visual disturbance.   Respiratory:  Negative for shortness of breath and wheezing.    Cardiovascular:  Negative for chest pain and palpitations.   Gastrointestinal:  Negative for abdominal pain, nausea and vomiting.   Genitourinary:  Negative for dysuria and flank pain.   Musculoskeletal:  Positive for arthralgias (right hip pain x 2 years.) and back pain. Negative for myalgias and neck pain.   Skin:  Negative for color change and rash.   Neurological:  Negative for dizziness, weakness, numbness and headaches.   Psychiatric/Behavioral:  Negative for behavioral problems, dysphoric mood, self-injury and sleep disturbance.         reports that she has been smoking cigarettes. She has never used smokeless tobacco. She reports that she does not currently use alcohol. She reports current drug use. Drug: Marijuana.    Current Outpatient Medications   Medication Instructions    amphetamine-dextroamphetamine (ADDERALL) 5 MG tablet     gabapentin (NEURONTIN) 100 MG capsule     methylPREDNISolone (MEDROL) 4 MG dose pack Take as directed on package instructions.    multivitamin with minerals (Multivitamin Adults) tablet tablet 1 tablet, Oral, Daily    naproxen (NAPROSYN) 500 mg, Oral, 2 Times Daily With Meals    predniSONE (DELTASONE) 10 MG (48) dose pack Take as directed.    tiZANidine (ZANAFLEX) 4 MG tablet Take 1/2 - 1 tablet at bedtime. Do not drive for 8 hours after taking.    VITAMIN D PO Oral       OBJECTIVE:  /81 (BP Location: Left arm, Patient Position: Sitting, Cuff Size: Adult)   Pulse 103   Temp 97.4 °F (36.3 °C) (Infrared)   Resp 18   Ht 172.7 cm (67.99\")   Wt 63.6 kg (140 lb 2 oz)   SpO2 99%   BMI 21.31 kg/m²    Physical Exam  Vitals and nursing note reviewed.   Constitutional:       Appearance: Normal appearance. She is well-developed.   HENT:      Head: Normocephalic " and atraumatic.      Right Ear: Tympanic membrane, ear canal and external ear normal.      Left Ear: Tympanic membrane, ear canal and external ear normal.      Nose: Nose normal. No septal deviation, nasal tenderness or congestion.      Mouth/Throat:      Lips: Pink. No lesions.      Mouth: Mucous membranes are moist. No oral lesions.      Dentition: Normal dentition.      Pharynx: Oropharynx is clear. No pharyngeal swelling, oropharyngeal exudate or posterior oropharyngeal erythema.   Eyes:      General: Lids are normal. Vision grossly intact. No scleral icterus.        Right eye: No discharge.         Left eye: No discharge.      Extraocular Movements: Extraocular movements intact.      Conjunctiva/sclera: Conjunctivae normal.      Right eye: Right conjunctiva is not injected.      Left eye: Left conjunctiva is not injected.      Pupils: Pupils are equal, round, and reactive to light.   Neck:      Thyroid: No thyroid mass.      Trachea: Trachea normal.   Cardiovascular:      Rate and Rhythm: Normal rate and regular rhythm.      Heart sounds: Normal heart sounds. No murmur heard.     No gallop.   Pulmonary:      Effort: Pulmonary effort is normal.      Breath sounds: Normal breath sounds and air entry. No wheezing, rhonchi or rales.   Musculoskeletal:         General: No tenderness or deformity. Normal range of motion.      Cervical back: Full passive range of motion without pain, normal range of motion and neck supple.      Thoracic back: Normal.        Back:       Right lower leg: No edema.      Left lower leg: No edema.      Comments: Tenderness with palpation.    Skin:     General: Skin is warm and dry.      Coloration: Skin is not jaundiced.      Findings: No rash.   Neurological:      Mental Status: She is alert and oriented to person, place, and time.      Sensory: Sensation is intact.      Motor: Motor function is intact.      Coordination: Coordination is intact.      Gait: Gait is intact.      Deep Tendon  Reflexes: Reflexes are normal and symmetric.   Psychiatric:         Mood and Affect: Mood and affect normal.         Behavior: Behavior normal.         Judgment: Judgment normal.       Physical Exam      BMI is within normal parameters. No other follow-up for BMI required.    Procedures    Results      Assessment/Plan:     Diagnoses and all orders for this visit:    1. Acute right-sided low back pain with right-sided sciatica (Primary)  -     MRI Lumbar Spine Without Contrast; Future  -     naproxen (Naprosyn) 500 MG tablet; Take 1 tablet by mouth 2 (Two) Times a Day With Meals for 30 days.  Dispense: 60 tablet; Refill: 0    2. Right hip pain          An After Visit Summary was printed and given to the patient at discharge.  Return in about 4 weeks (around 7/9/2024).       Assessment & Plan  1. Lumbar radiculopathy  The patient's symptoms may be attributed to the administration of steroids and anti-inflammatories, as these can elevate blood pressure. An MRI of the lumbar spine without contrast has been ordered. The patient has been advised to wear supportive shoes, avoid lifting, pulling, and tugging, and to continue chiropractic treatment. Naproxen has been refilled.    I spent 25 minutes caring for Swhetha on this date of service. This time includes time spent by me in the following activities: preparing for the visit, reviewing tests, obtaining and/or reviewing a separately obtained history, performing a medically appropriate examination and/or evaluation, counseling and educating the patient/family/caregiver, ordering medications, tests, or procedures, documenting information in the medical record, independently interpreting results and communicating that information with the patient/family/caregiver, and care coordination     Zeynep DUBON 6/11/2024   Electronically signed.    Patient or patient representative verbalized consent for the use of Ambient Listening during the visit with  Zeynep Moreno  JAGDISH Albarado for chart documentation. 6/11/2024  18:54 CDT

## 2024-06-12 ENCOUNTER — APPOINTMENT (OUTPATIENT)
Dept: MRI IMAGING | Facility: HOSPITAL | Age: 34
End: 2024-06-12
Payer: COMMERCIAL

## 2024-06-12 ENCOUNTER — HOSPITAL ENCOUNTER (EMERGENCY)
Facility: HOSPITAL | Age: 34
Discharge: HOME | End: 2024-06-12
Payer: COMMERCIAL

## 2024-06-12 ENCOUNTER — TELEPHONE (OUTPATIENT)
Dept: FAMILY MEDICINE CLINIC | Facility: CLINIC | Age: 34
End: 2024-06-12
Payer: COMMERCIAL

## 2024-06-12 VITALS
TEMPERATURE: 98.6 F | BODY MASS INDEX: 21.97 KG/M2 | WEIGHT: 140 LBS | RESPIRATION RATE: 19 BRPM | OXYGEN SATURATION: 98 % | DIASTOLIC BLOOD PRESSURE: 84 MMHG | HEIGHT: 67 IN | HEART RATE: 93 BPM | SYSTOLIC BLOOD PRESSURE: 124 MMHG

## 2024-06-12 DIAGNOSIS — M51.26 LUMBAR HERNIATED DISC: Primary | ICD-10-CM

## 2024-06-12 LAB
ALBUMIN SERPL-MCNC: 4.7 G/DL (ref 3.5–5.2)
ALBUMIN/GLOB SERPL: 1.7 G/DL
ALP SERPL-CCNC: 53 U/L (ref 39–117)
ALT SERPL W P-5'-P-CCNC: 20 U/L (ref 1–33)
ANION GAP SERPL CALCULATED.3IONS-SCNC: 8 MMOL/L (ref 5–15)
AST SERPL-CCNC: 15 U/L (ref 1–32)
B-HCG UR QL: NEGATIVE
BASOPHILS # BLD AUTO: 0.03 10*3/MM3 (ref 0–0.2)
BASOPHILS NFR BLD AUTO: 0.3 % (ref 0–1.5)
BILIRUB SERPL-MCNC: 0.4 MG/DL (ref 0–1.2)
BUN SERPL-MCNC: 19 MG/DL (ref 6–20)
BUN/CREAT SERPL: 38 (ref 7–25)
CALCIUM SPEC-SCNC: 9.8 MG/DL (ref 8.6–10.5)
CHLORIDE SERPL-SCNC: 100 MMOL/L (ref 98–107)
CO2 SERPL-SCNC: 31 MMOL/L (ref 22–29)
CREAT SERPL-MCNC: 0.5 MG/DL (ref 0.57–1)
DEPRECATED RDW RBC AUTO: 47 FL (ref 37–54)
EGFRCR SERPLBLD CKD-EPI 2021: 128 ML/MIN/1.73
EOSINOPHIL # BLD AUTO: 0.01 10*3/MM3 (ref 0–0.4)
EOSINOPHIL NFR BLD AUTO: 0.1 % (ref 0.3–6.2)
ERYTHROCYTE [DISTWIDTH] IN BLOOD BY AUTOMATED COUNT: 13.9 % (ref 12.3–15.4)
EXPIRATION DATE: NORMAL
GLOBULIN UR ELPH-MCNC: 2.7 GM/DL
GLUCOSE SERPL-MCNC: 103 MG/DL (ref 65–99)
HCT VFR BLD AUTO: 47.5 % (ref 34–46.6)
HGB BLD-MCNC: 15.5 G/DL (ref 12–15.9)
IMM GRANULOCYTES # BLD AUTO: 0.19 10*3/MM3 (ref 0–0.05)
IMM GRANULOCYTES NFR BLD AUTO: 1.7 % (ref 0–0.5)
INTERNAL NEGATIVE CONTROL: NEGATIVE
INTERNAL POSITIVE CONTROL: POSITIVE
LYMPHOCYTES # BLD AUTO: 2.26 10*3/MM3 (ref 0.7–3.1)
LYMPHOCYTES NFR BLD AUTO: 20.7 % (ref 19.6–45.3)
Lab: NORMAL
MCH RBC QN AUTO: 29.8 PG (ref 26.6–33)
MCHC RBC AUTO-ENTMCNC: 32.6 G/DL (ref 31.5–35.7)
MCV RBC AUTO: 91.3 FL (ref 79–97)
MONOCYTES # BLD AUTO: 0.53 10*3/MM3 (ref 0.1–0.9)
MONOCYTES NFR BLD AUTO: 4.9 % (ref 5–12)
NEUTROPHILS NFR BLD AUTO: 7.89 10*3/MM3 (ref 1.7–7)
NEUTROPHILS NFR BLD AUTO: 72.3 % (ref 42.7–76)
NRBC BLD AUTO-RTO: 0 /100 WBC (ref 0–0.2)
PLATELET # BLD AUTO: 295 10*3/MM3 (ref 140–450)
PMV BLD AUTO: 10.9 FL (ref 6–12)
POTASSIUM SERPL-SCNC: 4.1 MMOL/L (ref 3.5–5.2)
PROT SERPL-MCNC: 7.4 G/DL (ref 6–8.5)
RBC # BLD AUTO: 5.2 10*6/MM3 (ref 3.77–5.28)
SODIUM SERPL-SCNC: 139 MMOL/L (ref 136–145)
WBC NRBC COR # BLD AUTO: 10.91 10*3/MM3 (ref 3.4–10.8)

## 2024-06-12 PROCEDURE — 25010000002 DEXAMETHASONE PER 1 MG: Performed by: NURSE PRACTITIONER

## 2024-06-12 PROCEDURE — 99284 EMERGENCY DEPT VISIT MOD MDM: CPT

## 2024-06-12 PROCEDURE — 80053 COMPREHEN METABOLIC PANEL: CPT | Performed by: NURSE PRACTITIONER

## 2024-06-12 PROCEDURE — 96374 THER/PROPH/DIAG INJ IV PUSH: CPT

## 2024-06-12 PROCEDURE — 85025 COMPLETE CBC W/AUTO DIFF WBC: CPT | Performed by: NURSE PRACTITIONER

## 2024-06-12 PROCEDURE — 81025 URINE PREGNANCY TEST: CPT | Performed by: NURSE PRACTITIONER

## 2024-06-12 PROCEDURE — 72148 MRI LUMBAR SPINE W/O DYE: CPT

## 2024-06-12 RX ORDER — METHYLPREDNISOLONE 4 MG/1
TABLET ORAL
Qty: 21 EACH | Refills: 0 | Status: SHIPPED | OUTPATIENT
Start: 2024-06-12 | End: 2024-06-19

## 2024-06-12 RX ORDER — HYDROCODONE BITARTRATE AND ACETAMINOPHEN 5; 325 MG/1; MG/1
1 TABLET ORAL EVERY 4 HOURS PRN
Qty: 15 TABLET | Refills: 0 | Status: SHIPPED | OUTPATIENT
Start: 2024-06-12

## 2024-06-12 RX ORDER — SODIUM CHLORIDE 0.9 % (FLUSH) 0.9 %
10 SYRINGE (ML) INJECTION AS NEEDED
Status: DISCONTINUED | OUTPATIENT
Start: 2024-06-12 | End: 2024-06-12 | Stop reason: HOSPADM

## 2024-06-12 RX ORDER — DEXAMETHASONE SODIUM PHOSPHATE 10 MG/ML
8 INJECTION INTRAMUSCULAR; INTRAVENOUS ONCE
Status: COMPLETED | OUTPATIENT
Start: 2024-06-12 | End: 2024-06-12

## 2024-06-12 RX ADMIN — DEXAMETHASONE SODIUM PHOSPHATE 8 MG: 10 INJECTION INTRAMUSCULAR; INTRAVENOUS at 14:56

## 2024-06-12 NOTE — ED PROVIDER NOTES
Subjective   History of Present Illness  Patient is a 33-year-old female who presents to the ER with complaints of back pain.  She was sent by physical therapy today for further evaluation.  Patient has had low back pain for the past few weeks.  She was evaluated by her PCP who felt she likely was experiencing sciatica.  She had x-rays performed and was prescribed Zanaflex, steroids, and NSAIDs.  Patient was reevaluated by PCP with continued back pain and radiculopathy symptoms.  She has an outpatient MRI ordered of the lumbar spine and was referred to physical therapy.  Patient went to physical therapy today who was concerned about right leg weakness and issues with great toe weakness.  Additionally she has experienced numbness to the right buttock and patient's physical therapist felt she needed to come to the ER for MRI and further evaluation.  Patient denies any known injury.  She states that she has had what she thought was sciatica intermittently for the past 2 years.  She complains of pain worse with driving long distances.  Past medical history significant for ovarian cyst and stomach ulcer        Review of Systems   Constitutional:  Negative for fever.   HENT: Negative.  Negative for congestion.    Eyes: Negative.    Respiratory: Negative.  Negative for cough and shortness of breath.    Cardiovascular: Negative.  Negative for chest pain.   Gastrointestinal: Negative.  Negative for abdominal pain, constipation, diarrhea, nausea and vomiting.   Genitourinary: Negative.  Negative for dysuria.   Musculoskeletal:  Positive for back pain.   Skin: Negative.    Neurological:  Positive for weakness and numbness.   All other systems reviewed and are negative.      Past Medical History:   Diagnosis Date    Ovarian cyst     Stomach ulcer        No Known Allergies    Past Surgical History:   Procedure Laterality Date    OVARIAN CYST REMOVAL         Family History   Problem Relation Age of Onset    Cancer Father          Bladder and Myeloid Leukemia    Colon polyps Neg Hx     Colon cancer Neg Hx     Esophageal cancer Neg Hx     Liver cancer Neg Hx     Liver disease Neg Hx     Rectal cancer Neg Hx     Stomach cancer Neg Hx        Social History     Socioeconomic History    Marital status:    Tobacco Use    Smoking status: Some Days     Types: Cigarettes    Smokeless tobacco: Never   Vaping Use    Vaping status: Never Used   Substance and Sexual Activity    Alcohol use: Not Currently    Drug use: Yes     Types: Marijuana    Sexual activity: Defer           Objective   Physical Exam  Vitals and nursing note reviewed.   Constitutional:       Appearance: Normal appearance. She is well-developed.   HENT:      Head: Normocephalic and atraumatic.      Right Ear: External ear normal.      Left Ear: External ear normal.      Nose: Nose normal.      Mouth/Throat:      Pharynx: Oropharynx is clear.   Eyes:      Extraocular Movements: Extraocular movements intact.      Conjunctiva/sclera: Conjunctivae normal.   Cardiovascular:      Rate and Rhythm: Normal rate and regular rhythm.      Heart sounds: Normal heart sounds.   Pulmonary:      Effort: Pulmonary effort is normal.      Breath sounds: Normal breath sounds.   Abdominal:      General: Bowel sounds are normal.      Palpations: Abdomen is soft.   Musculoskeletal:         General: Tenderness present.      Cervical back: Normal range of motion and neck supple.      Comments: Pain to palpation to the lumbar spine in particular just to the right of the lumbar spine no obvious step-off or vertebral point tenderness noted, range of motion intact, patient is somewhat weaker to the right lower extremity as compared to the left lower extremity, complains of numbness to the right buttock, pulses palpable bilaterally   Skin:     General: Skin is warm and dry.   Neurological:      Mental Status: She is alert and oriented to person, place, and time.   Psychiatric:         Mood and Affect: Mood  normal.         Behavior: Behavior normal.         Thought Content: Thought content normal.         Judgment: Judgment normal.         Procedures           ED Course                                             Medical Decision Making  Patient is a 33-year-old female who presents to the ER with complaints of back pain.  She was sent by physical therapy today for further evaluation.  Patient has had low back pain for the past few weeks.  She was evaluated by her PCP who felt she likely was experiencing sciatica.  She had x-rays performed and was prescribed Zanaflex, steroids, and NSAIDs.  Patient was reevaluated by PCP with continued back pain and radiculopathy symptoms.  She has an outpatient MRI ordered of the lumbar spine and was referred to physical therapy.  Patient went to physical therapy today who was concerned about right leg weakness and issues with great toe weakness.  Additionally she has experienced numbness to the right buttock and patient's physical therapist felt she needed to come to the ER for MRI and further evaluation.  Patient denies any known injury.  She states that she has had what she thought was sciatica intermittently for the past 2 years.  She complains of pain worse with driving long distances.  Past medical history significant for ovarian cyst and stomach ulcer  Differential diagnosis: Pinched nerve, cauda equina, bulging disc, degenerative disc disease, and other    Labs Reviewed  COMPREHENSIVE METABOLIC PANEL - Abnormal; Notable for the following components:     Glucose                       103 (*)                Creatinine                    0.50 (*)               CO2                           31.0 (*)               BUN/Creatinine Ratio          38.0 (*)            All other components within normal limits         Narrative: GFR Normal >60                  Chronic Kidney Disease <60                  Kidney Failure <15                    CBC WITH AUTO DIFFERENTIAL - Abnormal; Notable  for the following components:     WBC                           10.91 (*)               Hematocrit                    47.5 (*)               Monocyte %                    4.9 (*)                Eosinophil %                  0.1 (*)                Immature Grans %              1.7 (*)                Neutrophils, Absolute         7.89 (*)               Immature Grans, Absolute      0.19 (*)            All other components within normal limits  POCT PEFORM URINE PREGNANCY - Normal  CBC AND DIFFERENTIAL     MRI Lumbar Spine Without Contrast   Final Result    1. There is a large migratory subarticular disc extrusion on the right    (series 601-image 10, series 501-image 15) resulting in a severe    right-sided neuroforaminal narrowing.                   This report was signed and finalized on 6/12/2024 5:59 PM by Dr Benji Dave.         Dr. Beavers reviewed MRI and case. He will see patient in follow up. Recommends more steroids and to continue with PT as scheduled. We will prescribe prn pain medication for break thru pain. Patient received a dose of steroids in the ER.  She will be discharged home shortly in stable condition.     Problems Addressed:  Lumbar herniated disc: acute illness or injury    Amount and/or Complexity of Data Reviewed  Labs: ordered. Decision-making details documented in ED Course.  Radiology: ordered. Decision-making details documented in ED Course.    Risk  Prescription drug management.        Final diagnoses:   Lumbar herniated disc       ED Disposition  ED Disposition       ED Disposition   Discharge    Condition   Good    Comment   --               Asif Beavers MD  [address] [id number] [phone]               Medication List        New Prescriptions      HYDROcodone-acetaminophen 5-325 MG per tablet  Commonly known as: NORCO  Take 1 tablet by mouth Every 4 (Four) Hours As Needed for Moderate Pain.            Changed      * methylPREDNISolone 4 MG dose  pack  Commonly known as: MEDROL  Take as directed on package instructions.  What changed: Another medication with the same name was added. Make sure you understand how and when to take each.     * methylPREDNISolone 4 MG dose pack  Commonly known as: MEDROL  Take as directed on package instructions.  What changed: You were already taking a medication with the same name, and this prescription was added. Make sure you understand how and when to take each.           * This list has 2 medication(s) that are the same as other medications prescribed for you. Read the directions carefully, and ask your doctor or other care provider to review them with you.                   Where to Get Your Medications        These medications were sent to [id number] - [place], [place] - [address] - [phone]  - [phone] FX  [address], [facility] [address]      Phone: [phone]   HYDROcodone-acetaminophen 5-325 MG per tablet  methylPREDNISolone 4 MG dose pack            Crissy Munoz, APRN  06/13/24 0812

## 2024-06-12 NOTE — TELEPHONE ENCOUNTER
Patient called stating that her physical therapist would like for patient to report to the ER for a STAT MRI due to the severity of her pain. I spoke with PCP and she stated to inform patient to seek ER for STAT MRI. Patient voiced understanding.

## 2024-06-13 ENCOUNTER — TELEPHONE (OUTPATIENT)
Dept: NEUROSURGERY | Facility: CLINIC | Age: 34
End: 2024-06-13
Payer: COMMERCIAL

## 2024-06-13 ENCOUNTER — TELEPHONE (OUTPATIENT)
Dept: FAMILY MEDICINE CLINIC | Facility: CLINIC | Age: 34
End: 2024-06-13
Payer: COMMERCIAL

## 2024-06-13 NOTE — TELEPHONE ENCOUNTER
I tried calling pt back . No answer, LVM for pt to call back if needed. She needs to be seen back in about 4 weeks.

## 2024-06-13 NOTE — TELEPHONE ENCOUNTER
DELETE AFTER REVIEWING: Send this encounter to the appropriate pool. See your Call Action Grid or Workflows for direction.        Hub staff attempted to follow warm transfer process and was unsuccessful     Caller: Shwetha Tobias    Relationship to patient: Self                DELETE AFTER READING TO PATIENT: “I was unable to reach my scheduling contact. I will send a message to the scheduling team. Please allow 48 hours for the  staff to follow up on this request.”

## 2024-06-13 NOTE — TELEPHONE ENCOUNTER
Patient called back & agrees with appointment as scheduled with Radha REESE [facility]  PHYSICIAN LEAD  DR TERRY BOB  [facility] [place] [facility]

## 2024-06-13 NOTE — DISCHARGE INSTRUCTIONS
You have a disc extrusion noted on MRI which sometimes referred to as slipped disc, herniated disc, or a prolapsed disc. Dr. Beavers reviewed your MRI. He recommends to continue with PT and f/u as outpatient. All of your MRI findings will be in MyChart. Call tomorrow for f/u appt. Avoid heavy lifting greater than 10 lbs.

## 2024-06-13 NOTE — TELEPHONE ENCOUNTER
Caller: Keo Tobiasia    Relationship: Self    Best call back number:  [phone] (Home)     What is the best time to reach you:  ANY    Who are you requesting to speak with (clinical staff, provider,  specific staff member): CLINICAL         What was the call regarding: REGARDING HER LAB WORK.  WANTED TO TOUCH BASE WITH EVERYTHING GOING ON.  WHEN DOES SHE NEED TO COME BACK IN? AFTER OTHER LABS?  PLEASE CALL BACK WITH ADVISEMENT.

## 2024-06-18 ENCOUNTER — TELEPHONE (OUTPATIENT)
Dept: FAMILY MEDICINE CLINIC | Facility: CLINIC | Age: 34
End: 2024-06-18
Payer: COMMERCIAL

## 2024-06-18 NOTE — TELEPHONE ENCOUNTER
Caller: Jatinder Shwetha    Relationship: Self    Best call back number: [phone]     What is the best time to reach you: ANYTIME    Who are you requesting to speak with (clinical staff, provider,  specific staff member): CLINICAL    What was the call regarding: PATIENT WENT TO ER LAST WEEK AND HAS A HERNIATED DISC. SHE WAS TOLD TO DO PT UNTIL SHE SEES US. SHE DID GO TO PT AND THEY ARE VERY CONCERNED THAT SHE NEEDS SURGERY AS SOON AS POSSIBLE. SHE IS WONDERING IF SHE CAN GET AN OPEN REFERRAL TO A NEUROSURGEON SO SHE CAN CALL AROUND TO SEE WHO CAN GET HER IN THE FASTEST. PLEASE ADVISE.     Is it okay if the provider responds through MyChart: YES

## 2024-06-19 ENCOUNTER — OFFICE VISIT (OUTPATIENT)
Dept: FAMILY MEDICINE CLINIC | Facility: CLINIC | Age: 34
End: 2024-06-19
Payer: COMMERCIAL

## 2024-06-19 VITALS
DIASTOLIC BLOOD PRESSURE: 70 MMHG | SYSTOLIC BLOOD PRESSURE: 100 MMHG | RESPIRATION RATE: 19 BRPM | WEIGHT: 141 LBS | HEIGHT: 67 IN | HEART RATE: 114 BPM | OXYGEN SATURATION: 98 % | BODY MASS INDEX: 22.13 KG/M2 | TEMPERATURE: 97.3 F

## 2024-06-19 DIAGNOSIS — M51.26 HERNIATION OF LUMBAR INTERVERTEBRAL DISC: Primary | ICD-10-CM

## 2024-06-19 DIAGNOSIS — M54.41 ACUTE RIGHT-SIDED LOW BACK PAIN WITH RIGHT-SIDED SCIATICA: ICD-10-CM

## 2024-06-19 PROCEDURE — 99214 OFFICE O/P EST MOD 30 MIN: CPT | Performed by: NURSE PRACTITIONER

## 2024-06-19 RX ORDER — TIZANIDINE 4 MG/1
TABLET ORAL
Qty: 30 TABLET | Refills: 0 | Status: SHIPPED | OUTPATIENT
Start: 2024-06-19 | End: 2024-06-20 | Stop reason: SDUPTHER

## 2024-06-19 NOTE — PROGRESS NOTES
JAGDISH Carlson  [facility]   Family Medicine  [address]  [place] [address]  Phone: [phone]  Fax: [phone]         Chief Complaint:  Chief Complaint   Patient presents with    Referral for neurosurgery        History:  Shwetha Tobias is a 33 y.o. female.  History of Present Illness  The patient is a 32-year-old female who is here today for neurosurgery referral. She is accompanied by her boyfriend.    The patient was evaluated in the emergency room where she was diagnosed with a herniated lumbar disc and subsequently referred to neurosurgery. Initially, the neurosurgeon who was, told during patient's emergency room visit  recommended physical therapy for a duration of 2 weeks. However, upon her visit to the physical therapist, Christina, recommended a consultation with a neurosurgeon before physical therapy due to the patient's decreased range of motion and potential long-term nerve damage in the foot.  The physical therapist  recommended surgical intervention. She reports difficulty in elevating her right leg when lying on her side and difficulty raising her right big toe, which was first noticed during manipulation. She has a history of drop foot. The patient is scheduled to see  Denny DUBON with [facility] neurosurgery on 06/26/2024.  Patient reports she will take her final dose of steroids today.  She has completed 1 round of prednisone and 2 Medrol Dosepaks. She continues to take muscle relaxers at bedtime, but is uncertain of their efficacy. Hydrocodone has been prescribed for nighttime use due to stomach discomfort, but has slightly improved her sleep quality. Naproxen has been effective. Muscle relaxers do induce sleepiness. Nerve pain is more pronounced at night, with less back pain when sitting. She describes a sensation of burning in her foot and a sensation of bruising and soreness in her shin, particularly when touched. Her foot is sore at the  beginning of the day, but tends to improve as the day progresses. Over the past 3 days, she has been symptom-free until the end of the day, which she suspects may be indicative of neuropathy. She takes 300 mg of gabapentin nightly, but is prescribed 200 mg during the day if needed. She occasionally experiences a TENS unit sensation in the middle of her right foot. The left side of her right foot has been numb.  She has experienced an episode of cold sweats and nocturnal urination after starting steroids but this has since resolved.  Her ability to sit for extended periods has increased from 15 minutes to an hour.           ROS:  Review of Systems   Constitutional:  Negative for fatigue, fever and unexpected weight change.   HENT:  Negative for congestion, ear pain, rhinorrhea, sinus pressure, sinus pain and voice change.    Eyes:  Negative for visual disturbance.   Respiratory:  Negative for shortness of breath and wheezing.    Cardiovascular:  Negative for chest pain and palpitations.   Gastrointestinal:  Negative for abdominal pain, nausea and vomiting.   Genitourinary:  Negative for dysuria and flank pain.   Musculoskeletal:  Positive for arthralgias (right hip pain x 2 years.) and back pain. Negative for myalgias and neck pain.   Skin:  Negative for color change and rash.   Neurological:  Negative for dizziness, weakness, numbness and headaches.   Psychiatric/Behavioral:  Negative for behavioral problems, dysphoric mood, self-injury and sleep disturbance.         reports that she has been smoking cigarettes. She has never used smokeless tobacco. She reports that she does not currently use alcohol. She reports current drug use. Drug: Marijuana.    Current Outpatient Medications   Medication Instructions    amphetamine-dextroamphetamine (ADDERALL) 5 MG tablet     gabapentin (NEURONTIN) 100 MG capsule Take 3 capsules by mouth Every Night.    HYDROcodone-acetaminophen (NORCO) 5-325 MG per tablet 1 tablet, Oral,  "Every 4 Hours PRN    methylPREDNISolone (MEDROL) 4 MG dose pack Take as directed on package instructions.    multivitamin with minerals (Multivitamin Adults) tablet tablet 1 tablet, Oral, Daily    naproxen (NAPROSYN) 500 mg, Oral, 2 Times Daily With Meals    tiZANidine (ZANAFLEX) 4 MG tablet Take 1/2 - 1 tablet at bedtime. Do not drive for 8 hours after taking.    VITAMIN D PO Oral       OBJECTIVE:  /70 (BP Location: Left arm, Patient Position: Sitting, Cuff Size: Adult)   Pulse 114   Temp 97.3 °F (36.3 °C) (Infrared)   Resp 19   Ht 170.2 cm (67.01\")   Wt 64 kg (141 lb)   LMP 06/12/2024 (Exact Date)   SpO2 98%   BMI 22.08 kg/m²    Physical Exam  Vitals and nursing note reviewed.   Constitutional:       Appearance: Normal appearance. She is well-developed.   HENT:      Head: Normocephalic and atraumatic.      Right Ear: Tympanic membrane, ear canal and external ear normal.      Left Ear: Tympanic membrane, ear canal and external ear normal.      Nose: Nose normal. No septal deviation, nasal tenderness or congestion.      Mouth/Throat:      Lips: Pink. No lesions.      Mouth: Mucous membranes are moist. No oral lesions.      Dentition: Normal dentition.      Pharynx: Oropharynx is clear. No pharyngeal swelling, oropharyngeal exudate or posterior oropharyngeal erythema.   Eyes:      General: Lids are normal. Vision grossly intact. No scleral icterus.        Right eye: No discharge.         Left eye: No discharge.      Extraocular Movements: Extraocular movements intact.      Conjunctiva/sclera: Conjunctivae normal.      Right eye: Right conjunctiva is not injected.      Left eye: Left conjunctiva is not injected.      Pupils: Pupils are equal, round, and reactive to light.   Neck:      Thyroid: No thyroid mass.      Trachea: Trachea normal.   Cardiovascular:      Rate and Rhythm: Normal rate and regular rhythm.      Heart sounds: Normal heart sounds. No murmur heard.     No gallop.   Pulmonary:      " Effort: Pulmonary effort is normal.      Breath sounds: Normal breath sounds and air entry. No wheezing, rhonchi or rales.   Musculoskeletal:         General: No tenderness or deformity.        Arms:       Cervical back: Full passive range of motion without pain, normal range of motion and neck supple.      Thoracic back: Normal.      Lumbar back: Tenderness present. Decreased range of motion. Positive right straight leg raise test.      Right lower leg: No edema.      Left lower leg: No edema.      Comments: Tenderness noted to area indicated above.  Patient is able to sit with her legs crisscrossed during exam.  She was able to transfer from a supine position to a sitting position without difficulty or pain noted.   Skin:     General: Skin is warm and dry.      Coloration: Skin is not jaundiced.      Findings: No rash.   Neurological:      Mental Status: She is alert and oriented to person, place, and time.      Sensory: Sensation is intact.      Motor: Motor function is intact.      Coordination: Coordination is intact.      Gait: Gait is intact.      Deep Tendon Reflexes: Reflexes are normal and symmetric.   Psychiatric:         Mood and Affect: Mood and affect normal.         Behavior: Behavior normal.         Judgment: Judgment normal.       Physical Exam      BMI is within normal parameters. No other follow-up for BMI required.    Procedures    Results  Laboratory Studies  BUN was 19. Creatinine was a bit low for the last 2 years. BUN creatinine ratio was slightly elevated.    Assessment/Plan:     Diagnoses and all orders for this visit:    1. Herniation of lumbar intervertebral disc (Primary)  -     Ambulatory Referral to Neurosurgery    2. Acute right-sided low back pain with right-sided sciatica  -     Discontinue: tiZANidine (ZANAFLEX) 4 MG tablet; Take 1/2 - 1 tablet at bedtime. Do not drive for 8 hours after taking.  Dispense: 30 tablet; Refill: 0  -     Ambulatory Referral to Neurosurgery          An  After Visit Summary was printed and given to the patient at discharge.  Return in about 3 months (around 9/19/2024), or if symptoms worsen or fail to improve.       Assessment & Plan  1. Herniated lumbar disc.  The patient is advised to persist with the current regimen of naproxen, administered twice daily. Additional steroids will be temporarily discontinued. She is encouraged to engage in stretching exercises. A work note will be provided until her neurosurgery evaluation. Should she express a desire for a second opinion, a referral will be made accordingly.  Patient contacted the office to request referral for second opinion to the [facility] neurosurgery.    I spent 31 minutes caring for Shwetha on this date of service. This time includes time spent by me in the following activities: preparing for the visit, reviewing tests, performing a medically appropriate examination and/or evaluation, counseling and educating the patient/family/caregiver, documenting information in the medical record, independently interpreting results and communicating that information with the patient/family/caregiver, and care coordination     Zeynep DUBON 6/20/2024   Electronically signed.    Patient or patient representative verbalized consent for the use of Ambient Listening during the visit with  JAGDISH Carlson for chart documentation. 6/20/2024  22:18 CDT

## 2024-06-20 ENCOUNTER — TELEPHONE (OUTPATIENT)
Dept: FAMILY MEDICINE CLINIC | Facility: CLINIC | Age: 34
End: 2024-06-20
Payer: COMMERCIAL

## 2024-06-20 DIAGNOSIS — M54.41 ACUTE RIGHT-SIDED LOW BACK PAIN WITH RIGHT-SIDED SCIATICA: ICD-10-CM

## 2024-06-20 RX ORDER — TIZANIDINE 4 MG/1
TABLET ORAL
Qty: 30 TABLET | Refills: 0 | Status: SHIPPED | OUTPATIENT
Start: 2024-06-20

## 2024-06-20 NOTE — TELEPHONE ENCOUNTER
I called patient back and switched pharmacy for the patient for the Zanaflex to go to [facility], and patient stated that she would like for the neurosurgery referral to go to [place].

## 2024-06-20 NOTE — TELEPHONE ENCOUNTER
Caller: Shwetha Tobias    Relationship: Self    Best call back number: [phone]     What is the best time to reach you: ANYTIME    Who are you requesting to speak with (clinical staff, provider,  specific staff member): CLINICAL    What was the call regarding: PATIENT STATES THAT SHE WOULD LIKE TO LEAVE A MESSAGE FOR PROVIDER THAT SHE FOUND A PLACE SHE WOULD LIKE TO BE REFERRED TOO AND IF MEDICATION OF A MUSCLE  RELAXER BE SENT IN. WOULD LIKE A CALL BACK.

## 2024-06-26 ENCOUNTER — TELEPHONE (OUTPATIENT)
Dept: FAMILY MEDICINE CLINIC | Facility: CLINIC | Age: 34
End: 2024-06-26
Payer: COMMERCIAL

## 2024-06-26 DIAGNOSIS — M54.41 ACUTE RIGHT-SIDED LOW BACK PAIN WITH RIGHT-SIDED SCIATICA: ICD-10-CM

## 2024-06-26 DIAGNOSIS — M51.26 HERNIATION OF LUMBAR INTERVERTEBRAL DISC: Primary | ICD-10-CM

## 2024-06-26 NOTE — TELEPHONE ENCOUNTER
I called patient and she stated that she needed a new referral to Dr Bazan due to her seeing [facility], I sent a referral request to PCP. I informed patient that Zeynep is out of office this week, patient voiced understanding.

## 2024-06-26 NOTE — TELEPHONE ENCOUNTER
Caller: Shwetha Tobias    Relationship: Self    Best call back number: [phone]     Who are you requesting to speak with (clinical staff, provider,  specific staff member): SARAH LOCKHART     What was the call regarding: PATIENT REQUESTING CALLBACK REGARDING HER RECENT CONSULT OF THE HERNIATED DISC.

## 2024-08-28 ENCOUNTER — TELEPHONE (OUTPATIENT)
Dept: FAMILY MEDICINE CLINIC | Facility: CLINIC | Age: 34
End: 2024-08-28
Payer: COMMERCIAL

## 2024-08-28 NOTE — TELEPHONE ENCOUNTER
Claudine Director called from my office to talk with patient no answer left voice mail to call office back . If patient calls she is to talk with Claudine GILES

## 2024-08-29 ENCOUNTER — TELEPHONE (OUTPATIENT)
Dept: FAMILY MEDICINE CLINIC | Facility: CLINIC | Age: 34
End: 2024-08-29
Payer: COMMERCIAL

## 2024-08-29 NOTE — TELEPHONE ENCOUNTER
Claudine GILES. Gonzalo  D.O . Have tried to contact patients times 2. We have left our name and ask for return call. No reply at this time

## 2024-09-18 ENCOUNTER — TELEPHONE (OUTPATIENT)
Dept: FAMILY MEDICINE CLINIC | Facility: CLINIC | Age: 34
End: 2024-09-18
Payer: COMMERCIAL